# Patient Record
Sex: FEMALE | Race: WHITE
[De-identification: names, ages, dates, MRNs, and addresses within clinical notes are randomized per-mention and may not be internally consistent; named-entity substitution may affect disease eponyms.]

---

## 2017-03-13 ENCOUNTER — HOSPITAL ENCOUNTER (OUTPATIENT)
Dept: HOSPITAL 45 - C.LABMFLN | Age: 72
Discharge: HOME | End: 2017-03-13
Attending: FAMILY MEDICINE
Payer: COMMERCIAL

## 2017-03-13 DIAGNOSIS — I48.0: ICD-10-CM

## 2017-03-13 DIAGNOSIS — I10: ICD-10-CM

## 2017-03-13 DIAGNOSIS — E78.5: Primary | ICD-10-CM

## 2017-03-13 LAB
ALT SERPL-CCNC: 24 U/L (ref 12–78)
ANION GAP SERPL CALC-SCNC: 6 MMOL/L (ref 3–11)
AST SERPL-CCNC: 19 U/L (ref 15–37)
BUN SERPL-MCNC: 18 MG/DL (ref 7–18)
BUN/CREAT SERPL: 28.3 (ref 10–20)
CALCIUM SERPL-MCNC: 8.5 MG/DL (ref 8.5–10.1)
CHLORIDE SERPL-SCNC: 104 MMOL/L (ref 98–107)
CHOLEST/HDLC SERPL: 5.2 {RATIO}
CO2 SERPL-SCNC: 31 MMOL/L (ref 21–32)
CREAT SERPL-MCNC: 0.65 MG/DL (ref 0.6–1.2)
GLUCOSE SERPL-MCNC: 87 MG/DL (ref 70–99)
GLUCOSE UR QL: 27 MG/DL
KETONES UR QL STRIP: 84 MG/DL
MAGNESIUM SERPL-MCNC: 2 MG/DL (ref 1.8–2.4)
NITRITE UR QL STRIP: 143 MG/DL (ref 0–150)
PH UR: 140 MG/DL (ref 0–200)
POTASSIUM SERPL-SCNC: 3.7 MMOL/L (ref 3.5–5.1)
SODIUM SERPL-SCNC: 141 MMOL/L (ref 136–145)
VERY LOW DENSITY LIPOPROT CALC: 29 MG/DL

## 2017-03-20 ENCOUNTER — HOSPITAL ENCOUNTER (OUTPATIENT)
Dept: HOSPITAL 45 - C.RAD | Age: 72
Discharge: HOME | End: 2017-03-20
Attending: UROLOGY
Payer: COMMERCIAL

## 2017-03-20 DIAGNOSIS — N20.0: ICD-10-CM

## 2017-03-20 DIAGNOSIS — R31.29: Primary | ICD-10-CM

## 2017-03-20 NOTE — DIAGNOSTIC IMAGING REPORT
[facility]



HISTORY:      Microscopic hematuria.



COMPARISON: [facility] 9/19/2016.



FINDINGS: The bowel gas pattern is unremarkable. There are no dilated loops of

small bowel to suggest an obstruction.  There are few punctate calcifications

within the right kidney, unchanged. Interval fragmentation of the dominant stone

within the lower pole of the left kidney with at least 4 stones within the lower

pole measuring up to 3 mm. There is also a 4 mm stone within the interpolar

region of the left kidney. Stable 1.7 cm round calcification within the left

deep pelvis which likely represents an epiploic appendage when compared to the

prior CT examination. No ureteral calculi. No pneumoperitoneum or pneumatosis.



IMPRESSION:  

Bilateral nephrolithiasis as described above. No ureteral calculi.







Electronically signed by:  Lupillo Lawson M.D.

3/20/2017 9:59 AM



Dictated Date/Time:  3/20/2017 9:57 AM

## 2017-04-12 ENCOUNTER — HOSPITAL ENCOUNTER (OUTPATIENT)
Dept: HOSPITAL 45 - C.LABMFLN | Age: 72
Discharge: HOME | End: 2017-04-12
Attending: INTERNAL MEDICINE
Payer: COMMERCIAL

## 2017-04-12 DIAGNOSIS — N20.0: Primary | ICD-10-CM

## 2017-04-12 DIAGNOSIS — I10: ICD-10-CM

## 2017-04-12 DIAGNOSIS — R31.29: ICD-10-CM

## 2017-04-12 LAB
ALBUMIN/GLOB SERPL: 1.1 {RATIO} (ref 0.9–2)
ALP SERPL-CCNC: 58 U/L (ref 45–117)
ALT SERPL-CCNC: 26 U/L (ref 12–78)
ANION GAP SERPL CALC-SCNC: 6 MMOL/L (ref 3–11)
APPEARANCE UR: CLEAR
AST SERPL-CCNC: 19 U/L (ref 15–37)
BILIRUB UR-MCNC: (no result) MG/DL
BUN SERPL-MCNC: 28 MG/DL (ref 7–18)
BUN/CREAT SERPL: 37.7 (ref 10–20)
CALCIUM SERPL-MCNC: 8.7 MG/DL (ref 8.5–10.1)
CHLORIDE SERPL-SCNC: 104 MMOL/L (ref 98–107)
CO2 SERPL-SCNC: 30 MMOL/L (ref 21–32)
COLOR UR: YELLOW
CREAT SERPL-MCNC: 0.73 MG/DL (ref 0.6–1.2)
CREAT UR-MCNC: 66 MG/DL
EOSINOPHIL NFR BLD AUTO: 270 K/UL (ref 130–400)
GLOBULIN SER-MCNC: 3.4 GM/DL (ref 2.5–4)
GLUCOSE SERPL-MCNC: 96 MG/DL (ref 70–99)
HCT VFR BLD CALC: 36.4 % (ref 37–47)
MAGNESIUM SERPL-MCNC: 1.9 MG/DL (ref 1.8–2.4)
MANUAL MICROSCOPIC REQUIRED?: NO
MCH RBC QN AUTO: 32 PG (ref 25–34)
MCHC RBC AUTO-ENTMCNC: 34.1 G/DL (ref 32–36)
MCV RBC AUTO: 93.8 FL (ref 80–100)
NITRITE UR QL STRIP: (no result)
PH UR STRIP: 7.5 [PH] (ref 4.5–7.5)
PMV BLD AUTO: 9.3 FL (ref 7.4–10.4)
POTASSIUM SERPL-SCNC: 4.1 MMOL/L (ref 3.5–5.1)
PROT UR STRIP-MCNC: 10.7 MG/DL (ref 0–11.9)
RBC # BLD AUTO: 3.88 M/UL (ref 4.2–5.4)
REVIEW REQ?: NO
SODIUM SERPL-SCNC: 140 MMOL/L (ref 136–145)
SP GR UR STRIP: 1.01 (ref 1–1.03)
URATE SERPL-MCNC: 5.3 MG/DL (ref 2.6–7.2)
URINE PROTIEN/CREAT RATIO: 0.2 (ref 0–0.2)
UROBILINOGEN UR-MCNC: (no result) MG/DL
WBC # BLD AUTO: 5.28 K/UL (ref 4.8–10.8)

## 2017-04-17 NOTE — CODING QUERY MEDICAL NECESSITY
CQSUPPORTING DIAGNOSIS NEEDED





A supporting diagnosis is required for the test/procedure performed on this patient in 
order for us to be reimbursed by the patient's insurance. Please provide a supporting 
diagnosis for the following test/procedure listed below next to the test name along with 
your signature. 



*If there is no additional diagnosis for this patient that would support the following 
test/procedure please document that below next to the test/procedure.



Test(s)/Procedure(s) that require a supporting diagnosis:



DOS   04/12/17         VITAMIN D TEST







Provider Signature:  ______________________________  Date:  _______



Thank you  

Muna Luther

Health Information Management

Phone:  [phone]

Fax:  [phone]



Once completed, please kindly fax back to [phone]



For questions please call [phone]

## 2017-07-10 ENCOUNTER — HOSPITAL ENCOUNTER (OUTPATIENT)
Dept: HOSPITAL 45 - C.RAD | Age: 72
Discharge: HOME | End: 2017-07-10
Attending: UROLOGY
Payer: COMMERCIAL

## 2017-07-10 DIAGNOSIS — N20.1: ICD-10-CM

## 2017-07-10 DIAGNOSIS — R31.29: ICD-10-CM

## 2017-07-10 DIAGNOSIS — N20.0: Primary | ICD-10-CM

## 2017-07-28 ENCOUNTER — HOSPITAL ENCOUNTER (EMERGENCY)
Dept: HOSPITAL 45 - C.EDB | Age: 72
Discharge: HOME | End: 2017-07-28
Payer: COMMERCIAL

## 2017-07-28 VITALS
HEIGHT: 62.01 IN | WEIGHT: 118.61 LBS | HEIGHT: 62.01 IN | BODY MASS INDEX: 21.55 KG/M2 | WEIGHT: 118.61 LBS | BODY MASS INDEX: 21.55 KG/M2

## 2017-07-28 VITALS — OXYGEN SATURATION: 96 % | SYSTOLIC BLOOD PRESSURE: 142 MMHG | DIASTOLIC BLOOD PRESSURE: 62 MMHG | HEART RATE: 67 BPM

## 2017-07-28 VITALS — TEMPERATURE: 97.7 F

## 2017-07-28 DIAGNOSIS — M81.0: ICD-10-CM

## 2017-07-28 DIAGNOSIS — F17.200: ICD-10-CM

## 2017-07-28 DIAGNOSIS — K57.30: ICD-10-CM

## 2017-07-28 DIAGNOSIS — N20.0: ICD-10-CM

## 2017-07-28 DIAGNOSIS — Z87.442: ICD-10-CM

## 2017-07-28 DIAGNOSIS — Z79.82: ICD-10-CM

## 2017-07-28 DIAGNOSIS — Z95.5: ICD-10-CM

## 2017-07-28 DIAGNOSIS — K43.9: ICD-10-CM

## 2017-07-28 DIAGNOSIS — I48.91: ICD-10-CM

## 2017-07-28 DIAGNOSIS — R10.11: Primary | ICD-10-CM

## 2017-07-28 LAB
ALP SERPL-CCNC: 58 U/L (ref 45–117)
ALT SERPL-CCNC: 19 U/L (ref 12–78)
ANION GAP SERPL CALC-SCNC: 7 MMOL/L (ref 3–11)
APPEARANCE UR: CLEAR
AST SERPL-CCNC: 16 U/L (ref 15–37)
BASOPHILS # BLD: 0.05 K/UL (ref 0–0.2)
BASOPHILS NFR BLD: 0.9 %
BILIRUB UR-MCNC: (no result) MG/DL
BUN SERPL-MCNC: 18 MG/DL (ref 7–18)
BUN/CREAT SERPL: 21.8 (ref 10–20)
CALCIUM SERPL-MCNC: 8.8 MG/DL (ref 8.5–10.1)
CHLORIDE SERPL-SCNC: 109 MMOL/L (ref 98–107)
CO2 SERPL-SCNC: 25 MMOL/L (ref 21–32)
COLOR UR: YELLOW
COMPLETE: YES
CREAT CL PREDICTED SERPL C-G-VRATE: 47.9 ML/MIN
CREAT SERPL-MCNC: 0.84 MG/DL (ref 0.6–1.2)
EOSINOPHIL NFR BLD AUTO: 236 K/UL (ref 130–400)
GLUCOSE SERPL-MCNC: 102 MG/DL (ref 70–99)
HCT VFR BLD CALC: 41.1 % (ref 37–47)
IG%: 0.2 %
IMM GRANULOCYTES NFR BLD AUTO: 34.3 %
LYMPHOCYTES # BLD: 1.86 K/UL (ref 1.2–3.4)
MANUAL MICROSCOPIC REQUIRED?: NO
MCH RBC QN AUTO: 31.5 PG (ref 25–34)
MCHC RBC AUTO-ENTMCNC: 33.6 G/DL (ref 32–36)
MCV RBC AUTO: 93.8 FL (ref 80–100)
MONOCYTES NFR BLD: 11.1 %
NEUTROPHILS # BLD AUTO: 2.8 %
NEUTROPHILS NFR BLD AUTO: 50.7 %
NITRITE UR QL STRIP: (no result)
PH UR STRIP: 7 [PH] (ref 4.5–7.5)
PMV BLD AUTO: 9.5 FL (ref 7.4–10.4)
POTASSIUM SERPL-SCNC: 3.3 MMOL/L (ref 3.5–5.1)
RBC # BLD AUTO: 4.38 M/UL (ref 4.2–5.4)
REVIEW REQ?: NO
SODIUM SERPL-SCNC: 141 MMOL/L (ref 136–145)
SP GR UR STRIP: 1.01 (ref 1–1.03)
URINE BILL WITH OR WITHOUT MIC: (no result)
UROBILINOGEN UR-MCNC: (no result) MG/DL
WBC # BLD AUTO: 5.42 K/UL (ref 4.8–10.8)
ZZUR CULT IF INDIC CLEAN CATCH: NO

## 2017-07-28 NOTE — EMERGENCY ROOM VISIT NOTE
History


Report prepared by Cierraibbecki:  Gokul Brush


Under the Supervision of:  Dr. Shad Campbell D.O.


First contact with patient:  02:02


Chief Complaint:  ABDOMINAL PAIN


Stated Complaint:  PAIN IN RIGHT SIDE





History of Present Illness


The patient is a 71 year old female who presents to the Emergency Room with 

complaints of intermittent RUQ abdominal pain beginning this morning. She 

initially thought she was constipated today and gave herself a fleet enema, but 

this has not resolved her pain. She states that her pain wraps around to her 

back. The patient has a history of an appendectomy. She denies any urinary 

symptoms, fevers, vomiting, or diarrhea. She denies any recent injury or 

straining. The patient has a history of frequent kidney stones. She does not 

feel that her symptoms are consistent with a kidney stone. She notes that she 

was scanned





   Source of History:  patient


   Onset:  this morning


   Position:  abdomen (RUQ)


   Timing:  intermittent


   Associated Symptoms:  + back pain (right side), No fevers, No vomiting, No 

diarrhea, No urinary symptoms





Review of Systems


See HPI for pertinent positives and negatives.  A total of ten systems were 

reviewed and were otherwise negative.





Past Medical & Surgical


Medical Problems:


(1) Atrial fibrillation


(2) Cystoscopy


(3) History of appendectomy


(4) Kidney stone


(5) Lithotripsy


(6) Osteoporosis


(7) Placement of stent in coronary artery


(8) Tobacco user








Family History


No pertinent family history stated.





Social History


Smoking Status:  Current Every Day Smoker


Alcohol Use:  none


Drug Use:  none


Marital Status:  


Housing Status:  lives with significant other


Occupation Status:  retired





Current/Historical Medications


Scheduled


Amiloride/Hctz (Moduretic 5MG/50MG *), 0.5 TAB PO @BREAKFAST


Aspirin (Aspirin Ec), 81 MG PO HS


Calcium/Vitamin D (Os-Ministerio 500 Plus D), 1 TAB PO QAM


Cholecalciferol (Vitamin D), 1,000 INTER.UNIT PO QAM


Ciprofloxacin Hcl (Cipro), 500 MG PO BID


Clopidogrel (Plavix), 75 MG PO QAM


Famotidine (Pepcid), 20 MG PO QAM


Isosorbide Mononitrate Ext Rel (Imdur Ext Rel), 30 MG PO QAM


Metoprolol Tartrate (Lopressor) (Lopressor), 25 MG PO QAM


Niacin Ext Rel (Niaspan Ext Rel), 500 MG PO HS


Nitroglycerin (Nitrostat), 0.4 MG UT PRN


Potassium Citrate (Urocit-K), 0.25 MG PO QAM


Simvastatin (Zocor), 40 MG PO HS


Tocopheryl Acet,Dl-Alpha (Vitamin E), 400 INTER.UNIT PO QAM





Scheduled PRN


Alprazolam (Xanax *), 0.25 MG PO Q6 PRN for Anxiety





Allergies


Coded Allergies:  


     No Known Allergies (Unverified , 7/28/17)





Physical Exam


Vital Signs











  Date Time  Temp Pulse Resp B/P (MAP) Pulse Ox O2 Delivery O2 Flow Rate FiO2


 


7/28/17 03:44  77 16 123/71 98 Room Air  


 


7/28/17 02:44  67      


 


7/28/17 01:59 36.5 77 18 150/74 96 Room Air  











Physical Exam


GENERAL: Awake, alert, well-appearing, in no distress


HENT: Normocephalic, atraumatic. Oropharynx unremarkable.


EYES: Normal conjunctiva. Sclera non-icteric.


NECK: Supple. No nuchal rigidity. FROM. No JVD.


RESPIRATORY: Clear to auscultation.


CARDIAC: Regular rate, normal rhythm. Extremities warm and well perfused. 

Pulses equal.


ABDOMEN: Soft, non-distended. No tenderness to palpation. No rebound or 

guarding. No masses.


RECTAL: Deferred.


MUSCULOSKELETAL: Chest examination reveals no tenderness. The back is 

symmetrical on inspection without obvious abnormality. Right flank tenderness. 

No joint edema. 


LOWER EXTREMITIES: Calves are equal size bilaterally and non-tender. No edema. 

No discoloration. 


NEURO: Normal sensorium. No sensory or motor deficits noted. 


SKIN: No rash or jaundice noted.





Medical Decision & Procedures


ER Provider


Diagnostic Interpretation:


CT results per statrad and my review.





CT ABDOMEN & PELVIS:


Right lower quadrant abdominal wall hernia containing loops of small bowl. No 

evidence for bowel obstruction or strangulation. Colonic diverticulosis without 

evidence of diverticulitis. Appendix is not seen. No right lower quadrant 

inflammatory changes to suggest appendicitis. Multiple non-obstructing 

bilateral renal calculi. No obstructing calculus or hydronephrosis. Several 

hyperdensities in the kidneys, some compatible with cysts and some too small to 

characterize. Probable hemangioma in the inferior right hepatic lobe. Probable 

small cyst in the left hepatic lobe. Small gallstones.





Laboratory Results


7/28/17 02:30








Red Blood Count 4.38, Mean Corpuscular Volume 93.8, Mean Corpuscular Hemoglobin 

31.5, Mean Corpuscular Hemoglobin Concent 33.6, Mean Platelet Volume 9.5, 

Neutrophils (%) (Auto) 50.7, Lymphocytes (%) (Auto) 34.3, Monocytes (%) (Auto) 

11.1, Eosinophils (%) (Auto) 2.8, Basophils (%) (Auto) 0.9, Neutrophils # (Auto

) 2.75, Lymphocytes # (Auto) 1.86, Monocytes # (Auto) 0.60, Eosinophils # (Auto

) 0.15, Basophils # (Auto) 0.05





7/28/17 02:30

















Test


  7/28/17


02:00 7/28/17


02:30


 


Urine Color YELLOW  


 


Urine Appearance CLEAR (CLEAR)  


 


Urine pH 7.0 (4.5-7.5)  


 


Urine Specific Gravity


  1.010


(1.000-1.030) 


 


 


Urine Protein NEG (NEG)  


 


Urine Glucose (UA) NEG (NEG)  


 


Urine Ketones NEG (NEG)  


 


Urine Occult Blood NEG (NEG)  


 


Urine Nitrite NEG (NEG)  


 


Urine Bilirubin NEG (NEG)  


 


Urine Urobilinogen NEG (NEG)  


 


Urine Leukocyte Esterase NEG (NEG)  


 


White Blood Count


  


  5.42 K/uL


(4.8-10.8)


 


Red Blood Count


  


  4.38 M/uL


(4.2-5.4)


 


Hemoglobin


  


  13.8 g/dL


(12.0-16.0)


 


Hematocrit  41.1 % (37-47) 


 


Mean Corpuscular Volume


  


  93.8 fL


()


 


Mean Corpuscular Hemoglobin


  


  31.5 pg


(25-34)


 


Mean Corpuscular Hemoglobin


Concent 


  33.6 g/dl


(32-36)


 


Platelet Count


  


  236 K/uL


(130-400)


 


Mean Platelet Volume


  


  9.5 fL


(7.4-10.4)


 


Neutrophils (%) (Auto)  50.7 % 


 


Lymphocytes (%) (Auto)  34.3 % 


 


Monocytes (%) (Auto)  11.1 % 


 


Eosinophils (%) (Auto)  2.8 % 


 


Basophils (%) (Auto)  0.9 % 


 


Neutrophils # (Auto)


  


  2.75 K/uL


(1.4-6.5)


 


Lymphocytes # (Auto)


  


  1.86 K/uL


(1.2-3.4)


 


Monocytes # (Auto)


  


  0.60 K/uL


(0.11-0.59)


 


Eosinophils # (Auto)


  


  0.15 K/uL


(0-0.5)


 


Basophils # (Auto)


  


  0.05 K/uL


(0-0.2)


 


RDW Standard Deviation


  


  46.5 fL


(36.4-46.3)


 


RDW Coefficient of Variation


  


  13.4 %


(11.5-14.5)


 


Immature Granulocyte % (Auto)  0.2 % 


 


Immature Granulocyte # (Auto)


  


  0.01 K/uL


(0.00-0.02)


 


Anion Gap


  


  7.0 mmol/L


(3-11)


 


Est Creatinine Clear Calc


Drug Dose 


  47.9 ml/min 


 


 


Estimated GFR (


American) 


  80.5 


 


 


Estimated GFR (Non-


American 


  69.4 


 


 


BUN/Creatinine Ratio  21.8 (10-20) 


 


Calcium Level


  


  8.8 mg/dl


(8.5-10.1)


 


Total Bilirubin


  


  0.3 mg/dl


(0.2-1)


 


Direct Bilirubin


  


  < 0.1 mg/dl


(0-0.2)


 


Aspartate Amino Transf


(AST/SGOT) 


  16 U/L (15-37) 


 


 


Alanine Aminotransferase


(ALT/SGPT) 


  19 U/L (12-78) 


 


 


Alkaline Phosphatase


  


  58 U/L


()


 


Total Protein


  


  7.5 gm/dl


(6.4-8.2)


 


Albumin


  


  3.7 gm/dl


(3.4-5.0)


 


Lipase


  


  227 U/L


()





Laboratory results reviewed by me





Medications Administered











 Medications


  (Trade)  Dose


 Ordered  Sig/Dominique


 Route  Start Time


 Stop Time Status Last Admin


Dose Admin


 


 Ketorolac


 Tromethamine


  (Toradol Inj)  30 mg  NOW  STAT


 IV  7/28/17 03:22


 7/28/17 03:23 DC 7/28/17 03:37


30 MG











ED Course


0204: The patient was evaluated in room B6. A complete history and physical 

exam was performed.





0322: Ordered Toradol Inj 30 mg IV. 





0430: I reevaluated the patient. Discussed results and discharge instructions: 

she verbalized understanding and agreement. The patient is ready for discharge.





Medical Decision


Differential diagnoses include but are not limited to; kidney stone, UTI, 

musculoskeletal, and gallbladder attack. 





Resting in no distress, i suspect more muscular pain. Abd soft, nontoxic pt, 

afebrile; discussed w/u with patient at bedside at 443am





Impression





 Primary Impression:  


 Right flank pain





Scribe Attestation


The scribe's documentation has been prepared under my direction and personally 

reviewed by me in its entirety. I confirm that the note above accurately 

reflects all work, treatment, procedures, and medical decision making performed 

by me.





Departure Information


Dispostion


Home / Self-Care





Prescriptions





Hydrocodone/Acetaminophen 5MG/325MG (Norco 5MG/325MG)  Tab


1 TABLET PO Q6 Y for Pain, #14 TAB


   Prov: Shad Campbell, DO         7/28/17





Referrals


Chuck Guillory M.D. (PCP)





Patient Instructions


ED Flank Pain Uncertain Cause, My [facility]

## 2017-07-28 NOTE — DIAGNOSTIC IMAGING REPORT
ABD/PELVIS IV CONTRAST ONLY



CLINICAL HISTORY: 71 years-old Female presenting with right flank pain, history

of kidney stones. 



TECHNIQUE: Multidetector CT of the abdomen and pelvis was performed after the

administration of intravenous contrast. IV contrast: 93 mL of Optiray 320. A

dose lowering technique was used consistent with the principles of ALARA (as low

as reasonably achievable). 



COMPARISON: 8/29/2015.



CT DOSE (mGy.cm): The estimated cumulative dose is 338.17 mGycm.



FINDINGS:



 topogram: Unremarkable.



Lung bases: Lung bases clear. Coronary artery and mitral annular calcification.

Normal heart size. No pericardial or pleural effusion.



Liver: Normal morphology. Well-defined 7 mm hypodensity in the medial left

hepatic lobe likely hepatic cyst or hamartoma although incompletely

characterized on the single phase examination. Patent hepatic vasculature.



Biliary: No intrahepatic or extrahepatic biliary ductal dilatation. Few layering

gallstones may be present. Suggestion of adenomyomatosis of the gallbladder

fundus. Gallbladder nondistended. 



Pancreas: Normal.



Spleen: Normal.



Adrenal glands: Normal.



Kidneys and ureters: Nonobstructing 2 mm calculus at the right upper pole.

Nonobstructing 2 mm calculus in the right interpolar region. Nonobstructing 2 mm

calculus at the right lower pole. Numerous nonobstructing calculi in the left

kidney the largest at the lower pole measuring 7 mm. No convincing evidence of

ureteral calculus, although multiple pelvic phleboliths are noted similar

distribution as on prior exam. Mild bilateral pelvocaliectasis. Multiple

well-defined hypodensities in the kidneys, likely simple cysts. Mild urothelial

thickening bilaterally.



Gastrointestinal tract: Sigmoid and descending colonic diverticulosis. No bowel

obstruction. Large duodenal diverticulum near the liver hilum and along the

posterior aspect of the descending duodenum. No associated inflammatory change.



Peritoneal cavity: No free fluid or intraperitoneal gas.



Bladder: Normal.



Pelvic organs: Uterus and ovaries normal.



Vasculature: Atherosclerosis of the abdominal aorta. Minimal ectasia and

irregularity of the infrarenal portion.



Lymph nodes: No enlarged lymph nodes in the abdomen or pelvis.



Abdominal wall: Anterior peritoneal defect with herniation of mesenteric fat and

small bowel without evidence of obstruction. The defect is lateral to the right

rectus abdominis and inferior to the umbilicus.



Musculoskeletal: Degenerative changes of the spine.



IMPRESSION:

1.  Multiple nonobstructing renal calculi bilaterally. Mild bilateral

pelvocaliectasis without convincing evidence of obstruction on the current exam.

No definitive ureteral calculus, allowing for the presence of numerous

phleboliths in the pelvis.



2.  Suggestion of mild urothelial thickening in the bilateral collecting

systems. This could be seen in the setting of infection. Correlate with

urinalysis.



3.  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



4.  Additional findings as above.











Electronically signed by:  Stephen Victoria M.D.

7/28/2017 7:39 AM



Dictated Date/Time:  7/28/2017 7:27 AM

## 2017-08-05 ENCOUNTER — HOSPITAL ENCOUNTER (INPATIENT)
Dept: HOSPITAL 45 - C.ED | Age: 72
LOS: 1 days | Discharge: HOME | DRG: 694 | End: 2017-08-06
Attending: FAMILY MEDICINE | Admitting: HOSPITALIST
Payer: COMMERCIAL

## 2017-08-05 VITALS
DIASTOLIC BLOOD PRESSURE: 70 MMHG | HEART RATE: 84 BPM | TEMPERATURE: 98.6 F | SYSTOLIC BLOOD PRESSURE: 166 MMHG | OXYGEN SATURATION: 96 %

## 2017-08-05 VITALS — OXYGEN SATURATION: 98 % | HEART RATE: 82 BPM

## 2017-08-05 VITALS
WEIGHT: 115.52 LBS | WEIGHT: 115.52 LBS | BODY MASS INDEX: 21.26 KG/M2 | HEIGHT: 62 IN | HEIGHT: 62 IN | BODY MASS INDEX: 21.26 KG/M2

## 2017-08-05 VITALS
HEART RATE: 93 BPM | TEMPERATURE: 98.78 F | DIASTOLIC BLOOD PRESSURE: 69 MMHG | OXYGEN SATURATION: 91 % | SYSTOLIC BLOOD PRESSURE: 150 MMHG

## 2017-08-05 VITALS — OXYGEN SATURATION: 94 % | HEART RATE: 86 BPM

## 2017-08-05 VITALS
SYSTOLIC BLOOD PRESSURE: 148 MMHG | HEART RATE: 97 BPM | TEMPERATURE: 98.42 F | OXYGEN SATURATION: 91 % | DIASTOLIC BLOOD PRESSURE: 69 MMHG

## 2017-08-05 VITALS — OXYGEN SATURATION: 98 %

## 2017-08-05 VITALS
TEMPERATURE: 98.06 F | OXYGEN SATURATION: 96 % | DIASTOLIC BLOOD PRESSURE: 59 MMHG | SYSTOLIC BLOOD PRESSURE: 158 MMHG | HEART RATE: 76 BPM

## 2017-08-05 DIAGNOSIS — Z95.5: ICD-10-CM

## 2017-08-05 DIAGNOSIS — M81.0: ICD-10-CM

## 2017-08-05 DIAGNOSIS — E87.1: ICD-10-CM

## 2017-08-05 DIAGNOSIS — K21.9: ICD-10-CM

## 2017-08-05 DIAGNOSIS — N13.30: Primary | ICD-10-CM

## 2017-08-05 DIAGNOSIS — M54.6: ICD-10-CM

## 2017-08-05 DIAGNOSIS — Z79.891: ICD-10-CM

## 2017-08-05 DIAGNOSIS — I25.10: ICD-10-CM

## 2017-08-05 DIAGNOSIS — F41.9: ICD-10-CM

## 2017-08-05 DIAGNOSIS — Z79.02: ICD-10-CM

## 2017-08-05 DIAGNOSIS — Z79.899: ICD-10-CM

## 2017-08-05 DIAGNOSIS — Z96.0: ICD-10-CM

## 2017-08-05 DIAGNOSIS — R10.9: ICD-10-CM

## 2017-08-05 DIAGNOSIS — Z87.442: ICD-10-CM

## 2017-08-05 DIAGNOSIS — I48.0: ICD-10-CM

## 2017-08-05 DIAGNOSIS — I10: ICD-10-CM

## 2017-08-05 DIAGNOSIS — R11.0: ICD-10-CM

## 2017-08-05 DIAGNOSIS — I25.2: ICD-10-CM

## 2017-08-05 DIAGNOSIS — E78.00: ICD-10-CM

## 2017-08-05 DIAGNOSIS — N39.0: ICD-10-CM

## 2017-08-05 DIAGNOSIS — Z79.82: ICD-10-CM

## 2017-08-05 LAB
ALP SERPL-CCNC: 56 U/L (ref 45–117)
ALT SERPL-CCNC: 18 U/L (ref 12–78)
ANION GAP SERPL CALC-SCNC: 6 MMOL/L (ref 3–11)
ANION GAP SERPL CALC-SCNC: 6 MMOL/L (ref 3–11)
APPEARANCE UR: CLEAR
AST SERPL-CCNC: 17 U/L (ref 15–37)
BASOPHILS # BLD: 0.02 K/UL (ref 0–0.2)
BASOPHILS NFR BLD: 0.3 %
BILIRUB UR-MCNC: (no result) MG/DL
BUN SERPL-MCNC: 11 MG/DL (ref 7–18)
BUN SERPL-MCNC: 6 MG/DL (ref 7–18)
BUN/CREAT SERPL: 11.8 (ref 10–20)
BUN/CREAT SERPL: 15.7 (ref 10–20)
CALCIUM SERPL-MCNC: 7.7 MG/DL (ref 8.5–10.1)
CALCIUM SERPL-MCNC: 8.3 MG/DL (ref 8.5–10.1)
CHLORIDE SERPL-SCNC: 101 MMOL/L (ref 98–107)
CHLORIDE SERPL-SCNC: 95 MMOL/L (ref 98–107)
CO2 SERPL-SCNC: 27 MMOL/L (ref 21–32)
CO2 SERPL-SCNC: 29 MMOL/L (ref 21–32)
COLOR UR: YELLOW
COMPLETE: YES
CREAT CL PREDICTED SERPL C-G-VRATE: 58.3 ML/MIN
CREAT CL PREDICTED SERPL C-G-VRATE: 77.4 ML/MIN
CREAT SERPL-MCNC: 0.52 MG/DL (ref 0.6–1.2)
CREAT SERPL-MCNC: 0.69 MG/DL (ref 0.6–1.2)
EOSINOPHIL NFR BLD AUTO: 209 K/UL (ref 130–400)
GLUCOSE SERPL-MCNC: 132 MG/DL (ref 70–99)
GLUCOSE SERPL-MCNC: 88 MG/DL (ref 70–99)
HCT VFR BLD CALC: 40.1 % (ref 37–47)
IG%: 0.2 %
IMM GRANULOCYTES NFR BLD AUTO: 19.1 %
LYMPHOCYTES # BLD: 1.25 K/UL (ref 1.2–3.4)
MAGNESIUM SERPL-MCNC: 2 MG/DL (ref 1.8–2.4)
MANUAL MICROSCOPIC REQUIRED?: NO
MCH RBC QN AUTO: 32.3 PG (ref 25–34)
MCHC RBC AUTO-ENTMCNC: 34.7 G/DL (ref 32–36)
MCV RBC AUTO: 93 FL (ref 80–100)
MONOCYTES NFR BLD: 10.7 %
NEUTROPHILS # BLD AUTO: 0.6 %
NEUTROPHILS NFR BLD AUTO: 69.1 %
NITRITE UR QL STRIP: (no result)
PH UR STRIP: 7 [PH] (ref 4.5–7.5)
PMV BLD AUTO: 9.8 FL (ref 7.4–10.4)
POTASSIUM SERPL-SCNC: 3.4 MMOL/L (ref 3.5–5.1)
POTASSIUM SERPL-SCNC: 3.7 MMOL/L (ref 3.5–5.1)
RBC # BLD AUTO: 4.31 M/UL (ref 4.2–5.4)
REVIEW REQ?: NO
SODIUM SERPL-SCNC: 130 MMOL/L (ref 136–145)
SODIUM SERPL-SCNC: 134 MMOL/L (ref 136–145)
SP GR UR STRIP: 1.02 (ref 1–1.03)
URINE BILL WITH OR WITHOUT MIC: (no result)
URINE EPITHELIAL CELL AUTO: (no result) /LPF (ref 0–5)
UROBILINOGEN UR-MCNC: (no result) MG/DL
WBC # BLD AUTO: 6.55 K/UL (ref 4.8–10.8)
ZZUR CULT IF INDIC CLEAN CATCH: NO

## 2017-08-05 RX ADMIN — NITROGLYCERIN SCH INCH: 20 OINTMENT TOPICAL at 12:45

## 2017-08-05 RX ADMIN — IPRATROPIUM BROMIDE SCH MG: 0.5 SOLUTION RESPIRATORY (INHALATION) at 13:52

## 2017-08-05 RX ADMIN — CEFTRIAXONE SODIUM SCH MLS/HR: 1 INJECTION, POWDER, FOR SOLUTION INTRAVENOUS at 12:45

## 2017-08-05 RX ADMIN — LEVALBUTEROL SCH MG: 1.25 SOLUTION, CONCENTRATE RESPIRATORY (INHALATION) at 13:52

## 2017-08-05 RX ADMIN — LEVALBUTEROL SCH MG: 1.25 SOLUTION, CONCENTRATE RESPIRATORY (INHALATION) at 19:18

## 2017-08-05 RX ADMIN — SODIUM CHLORIDE AND POTASSIUM CHLORIDE SCH MLS/HR: 9; 1.49 INJECTION, SOLUTION INTRAVENOUS at 21:09

## 2017-08-05 RX ADMIN — IPRATROPIUM BROMIDE SCH MG: 0.5 SOLUTION RESPIRATORY (INHALATION) at 19:15

## 2017-08-05 RX ADMIN — FAMOTIDINE SCH MLS/HR: 10 INJECTION INTRAVENOUS at 17:47

## 2017-08-05 RX ADMIN — NITROGLYCERIN SCH INCH: 20 OINTMENT TOPICAL at 17:48

## 2017-08-05 RX ADMIN — SODIUM CHLORIDE AND POTASSIUM CHLORIDE SCH MLS/HR: 9; 1.49 INJECTION, SOLUTION INTRAVENOUS at 12:44

## 2017-08-05 NOTE — DIAGNOSTIC IMAGING REPORT
CT THORACIC SPINE WITHOUT



CT DOSE:    



CLINICAL HISTORY: Thoracic spine pain    



TECHNIQUE: Helical images were acquired in transverse plane. Sagittal and

coronal reformatted images were acquired  A dose lowering technique was utilized

adhering to the principles of ALARA.





COMPARISON STUDY:  None.



FINDINGS: No paraspinal masses are visualized.



There is pulmonary emphysema.



No fractures are visualized. No destructive lesions are evident.



IMPRESSION:  

1. No fractures or subluxations identified

2. No destructive lesions are visualized

3. No paraspinal masses are visualized.

4. Pulmonary emphysema 







Electronically signed by:  Rehan Galeana M.D.

8/5/2017 9:30 AM



Dictated Date/Time:  8/5/2017 9:28 AM

## 2017-08-05 NOTE — HISTORY AND PHYSICAL
History & Physical


Date & Time of Service:


Aug 5, 2017 at 11:26


Chief Complaint:


Pain In Lower Back, Kidney Stones


Primary Care Physician:


Chuck Guillory M.D.


History of Present Illness


Source:  patient, spouse, hospital records


The patient is a 71-year-old female who presented to the emergency department 

initially on July 28 with symptoms of right flank pain, was assessed there and 

release of that time on oral pain medications.  She then went to the [facility] for 

a week planned vacation, but felt miserable the entire time there, and was seen 

by emergency care who then referred her to a local hospital where she was 

admitted overnight.  During that hospitalization she had a dobutamine stress 

echocardiogram which reportedly was normal and also had a CT of the chest for 

pulmonary embolus which is normal as well.  The patient's symptoms continued to 

worsen and expanded to cause bilateral interscapular pain, and thus the patient'

s and her  returned back to [place] early from their vacation.  

She presents emergency department today with right flank pain and bilateral 

interscapular area pain, feels miserable, has the chills, and has had decreased 

oral intake over the past 48 - 72 hours.  She has had kidney stones in the past

, but the pain in between her shoulder blades is completely new for her, and she

's never had the chills like she has now.





Past Medical/Surgical History


Medical Problems:


(1) Atrial fibrillation


Permanent Comment: paroxysmal


associated with colonoscopy





Status: Resolved  





(2) Cystoscopy


Status: Resolved  





(3) History of appendectomy


Status: Resolved  





(4) Kidney stone


Status: Resolved  





(5) Lithotripsy


Status: Resolved  





(6) Osteoporosis


Status: Chronic  





(7) Placement of stent in coronary artery


Status: Chronic  





(8) Tobacco user


Status: Chronic  











Family History





Cancer


Heart disease





Social History


Smoking Status:  Never Smoker


Smokeless Tobacco Use:  No


Alcohol Use:  none


Drug Use:  none


Marital Status:  


Housing status:  lives with family


Occupational Status:  retired





Immunizations


History of Influenza Vaccine:  N/A


History of Tetanus Vaccine?:  Yes


History of Pneumococcal:  Yes


History of Hepatitis B Vaccine:  No





Multi-Drug Resistant Organisms


History of MDRO:  No





Allergies


Coded Allergies:  


     No Known Allergies (Unverified , 8/5/17)





Home Medications


Scheduled


Amiloride/Hctz (Moduretic 5MG/50MG *), 0.5 TAB PO @BREAKFAST


Aspirin (Aspirin Ec), 81 MG PO HS


Calcium/Vitamin D (Os-Ministerio 500 Plus D), 1 TAB PO QAM


Cholecalciferol (Vitamin D), 1,000 INTER.UNIT PO QAM


Clopidogrel (Plavix), 75 MG PO QAM


Famotidine (Pepcid), 20 MG PO QAM


Isosorbide Mononitrate Ext Rel (Imdur Ext Rel), 30 MG PO QAM


Metoprolol Tartrate (Lopressor) (Lopressor), 25 MG PO QAM


Niacin Ext Rel (Niaspan Ext Rel), 500 MG PO HS


Nitroglycerin (Nitrostat), 0.4 MG UT PRN


Potassium Citrate (Urocit-K), 0.25 MG PO QAM


Simvastatin (Zocor), 40 MG PO HS


Tocopheryl Acet,Dl-Alpha (Vitamin E), 400 INTER.UNIT PO QAM





Scheduled PRN


Alprazolam (Xanax *), 0.25 MG PO Q6 PRN for Anxiety


Hydrocodone/Acetaminophen 5MG/325MG (Norco 5MG/325MG), 1 TABLET PO Q6 PRN for 

Pain





Review of Systems


The patient denies chest pain, palpitations, shortness of breath, cough, sore 

throat, nausea, vomiting, abdominal pain, blood in urine or stool, dysuria, 

urinary frequency or urgency, lightheadedness, dizziness, headache, memory loss

, rash, abnormal bruising or bleeding, imbalance, focal or generalized weakness

, numbness or tingling in arms or legs, arthralgias or myalgias, neck pain, 

night sweats, or allergy symptoms.


The review of systems is otherwise negative other than for that already noted 

above, and at least 10 systems have been reviewed.





Physical Exam


Vital Signs











  Date Time  Temp Pulse Resp B/P (MAP) Pulse Ox O2 Delivery O2 Flow Rate FiO2


 


8/5/17 10:59     95 Nasal Cannula 2.0 


 


8/5/17 10:34  70 20 174/67 95 Room Air  


 


8/5/17 08:56  71 18 131/51 94 Room Air  


 


8/5/17 07:59  67 20 155/62 96 Room Air  


 


8/5/17 07:16  71      


 


8/5/17 06:30 36.4 79 20 161/75 93 Room Air  








The patient is awake, alert and oriented 3, normocephalic and atraumatic, 

appears chronically ill, is wrapped in covers due to chills, otherwise lying in 

bed and in no acute distress.


HEENT--PERRL, EOMI, mucous membranes  and oropharynx dry.


Neck--supple, no JVD or bruits, thyroid normal, trachea midline, no adenopathy.


Heart--normal S1 and S2, no extra beats, no murmurs, rubs or gallops.


Lungs--clear bilaterally but diminished throughout, no respiratory distress, no 

accessory muscle use.


Abdomen--normal bowel sounds and soft, tender over flanks and interscapular 

area.  Nondistended, no hernias or masses,  no organomegaly.


Extremities--no cyanosis, clubbing or edema. There are good distal pulses b/l.


Dermatologic--normal skin turgor, normal color, warm and dry, no abnormal lymph 

nodes, no rash.


Neurologic--cranial nerves II through XII grossly intact, motor and sensory 

examination normal.


Rheumatologic--normal range of motion, nontender, muscles and joints.


Psychiatric--normal affect.





Diagnostics


Laboratory Results





Results Past 24 Hours








Test


  8/5/17


06:45 8/5/17


06:50 8/5/17


11:12 Range/Units


 


 


Urine Color YELLOW    


 


Urine Appearance CLEAR   CLEAR  


 


Urine pH 7.0   4.5-7.5  


 


Urine Specific Gravity 1.017   1.000-1.030  


 


Urine Protein NEG   NEG  


 


Urine Glucose (UA) NEG   NEG  


 


Urine Ketones 2+   NEG  


 


Urine Occult Blood 1+   NEG  


 


Urine Nitrite NEG   NEG  


 


Urine Bilirubin NEG   NEG  


 


Urine Urobilinogen NEG   NEG  


 


Urine Leukocyte Esterase NEG   NEG  


 


Urine WBC (Auto) 1-5   0-5  /hpf


 


Urine RBC (Auto) 5-10   0-4  /hpf


 


Urine Hyaline Casts (Auto) 0   0-5  /lpf


 


Urine Epithelial Cells (Auto) 5-10   0-5  /lpf


 


Urine Bacteria (Auto) NEG   NEG  


 


White Blood Count  6.55  4.8-10.8  K/uL


 


Red Blood Count  4.31  4.2-5.4  M/uL


 


Hemoglobin  13.9  12.0-16.0  g/dL


 


Hematocrit  40.1  37-47  %


 


Mean Corpuscular Volume  93.0    fL


 


Mean Corpuscular Hemoglobin  32.3  25-34  pg


 


Mean Corpuscular Hemoglobin


Concent 


  34.7


  


  32-36  g/dl


 


 


Platelet Count  209  130-400  K/uL


 


Mean Platelet Volume  9.8  7.4-10.4  fL


 


Neutrophils (%) (Auto)  69.1   %


 


Lymphocytes (%) (Auto)  19.1   %


 


Monocytes (%) (Auto)  10.7   %


 


Eosinophils (%) (Auto)  0.6   %


 


Basophils (%) (Auto)  0.3   %


 


Neutrophils # (Auto)  4.53  1.4-6.5  K/uL


 


Lymphocytes # (Auto)  1.25  1.2-3.4  K/uL


 


Monocytes # (Auto)  0.70  0.11-0.59  K/uL


 


Eosinophils # (Auto)  0.04  0-0.5  K/uL


 


Basophils # (Auto)  0.02  0-0.2  K/uL


 


RDW Standard Deviation  44.2  36.4-46.3  fL


 


RDW Coefficient of Variation  12.9  11.5-14.5  %


 


Immature Granulocyte % (Auto)  0.2   %


 


Immature Granulocyte # (Auto)  0.01  0.00-0.02  K/uL


 


Sodium Level  130  136-145  mmol/L


 


Potassium Level  3.4  3.5-5.1  mmol/L


 


Chloride Level  95    mmol/L


 


Carbon Dioxide Level  29  21-32  mmol/L


 


Anion Gap  6.0  3-11  mmol/L


 


Blood Urea Nitrogen  11  7-18  mg/dl


 


Creatinine


  


  0.69


  


  0.60-1.20


mg/dl


 


Est Creatinine Clear Calc


Drug Dose 


  58.3


  


   ml/min


 


 


Estimated GFR (


American) 


  100.8


  


   


 


 


Estimated GFR (Non-


American 


  87.0


  


   


 


 


BUN/Creatinine Ratio  15.7  10-20  


 


Random Glucose  132  70-99  mg/dl


 


Calcium Level  8.3  8.5-10.1  mg/dl


 


Total Bilirubin  0.5  0.2-1  mg/dl


 


Direct Bilirubin  0.1  0-0.2  mg/dl


 


Aspartate Amino Transf


(AST/SGOT) 


  17


  


  15-37  U/L


 


 


Alanine Aminotransferase


(ALT/SGPT) 


  18


  


  12-78  U/L


 


 


Alkaline Phosphatase  56    U/L


 


Troponin I  < 0.015  0-0.045  ng/ml


 


Total Protein  7.7  6.4-8.2  gm/dl


 


Albumin  3.9  3.4-5.0  gm/dl











Diagnostic Radiology


                                                                               

                                                                 


Patient Name: JUDSON POWERS                               Unit Number:  

[id number]                  


 





 











Dictated: 08/05/17 0747


 


Transcribed: 08/05/17 0747


 


ARG


 


Printed Date/Time: [~ rep prt dt]/[~ rep prt tm]








 [~ rep ct labl] - [~ rep ct ivnm]


 





   [facility]


 Radiology Department


 [place] [address] [phone]





 











Dictated: 08/05/17 0747


 


Transcribed: 08/05/17 0747


 


ARG


 


Printed Date/Time: [~ rep prt dt]/[~ rep prt tm]








 [~ rep ct labl] - [~ rep ct ivnm]


 








EXAMINATION: RENAL ULTRASOUND





CLINICAL HISTORY: Right renal colic    





COMPARISON STUDY:  8/4/2014, CT scan dated 7/28/2017





FINDINGS: The right kidney measures 10 cm. The left kidney measures 10 cm. 


There is mild bilateral hydronephrosis.  There are multiple bilateral renal


calculi.





No bladder abnormalities are visualized.  Bilateral ureteral jets were


visualized.


                 


IMPRESSION : 


1. Multiple bilateral renal calculi


2. Mild bilateral hydronephrosis


3. Bilateral ureteral jets were visualized











Electronically signed by:  Rehan Galeana M.D.


8/5/2017 7:50 AM





Dictated Date/Time:  8/5/2017 7:47 AM





 The status of this report is Signed.   


 Draft = Not yet reviewed or approved by Radiologist.  


 Signed = Reviewed and approved by Radiologist.


<AttendingPhy></AttendingPhy> <FamilyPhy>Chuck Guillory M.D.</FamilyPhy> <

PrimaryPhy>Chuck Guillory M.D.</PrimaryPhy> <UnitNumber>[id number]</UnitNumber> <

VisitNumber>[id number]</VisitNumber> <PatientName>JUDSON POWERS</PatientName

> <DateOfBirth>1945</DateOfBirth> <Location>[facility]</Location> <ServiceDate>

08/05/17</ServiceDate> <MNE>ESINDI</MNE> <OrderingPhy>Lucrecia Ma M.D.

</OrderingPhy> <OrderingPhyMNE>f rep ord dr lopes</OrderingPhyMNE> <

DictatingPhyMNE>f rep dict dr lopes</DictatingPhyMNE> <CCListMNE>f rep ct mne</

CCListMNE> <AdmittingPhyMNE>f pt admit dr lopes</AdmittingPhyMNE> <AttendingPhyMNE

>f pt attend dr lopes</AttendingPhyMNE>


<ConsultingPhyMNE>f pt consult dr lopes</ConsultingPhyMNE> <FamilyPhyMNE>f pt fam 

dr lopes</FamilyPhyMNE> <OtherPhyMNE>f pt other dr lopes</OtherPhyMNE> <

PrimaryPhyMNE>f pt prim care dr lopes</PrimaryPhyMNE> <ReferringPhyMNE>f pt 

referring dr lopes</ReferringPhyMNE>


                                                                               

                                                                 


Patient Name: JUDSON POWERS                               Unit Number:  

[id number]                  


 





 











Dictated: 08/05/17 0750


 


Transcribed: 08/05/17 0750


 


ARG


 


Printed Date/Time: [~ rep prt dt]/[~ rep prt tm]








 [~ rep ct labl] - [~ rep ct ivnm]


 





   [facility]


 Radiology Department


 [place] [address] [phone]





 











Dictated: 08/05/17 0750


 


Transcribed: 08/05/17 0750


 


ARG


 


Printed Date/Time: [~ rep prt dt]/[~ rep prt tm]








 [~ rep ct labl] - [~ rep ct ivnm]


 











CLINICAL HISTORY: R renal colic    





COMPARISON STUDY:  7/10/2017 





FINDINGS: There is no pathologic bowel dilatation. There is a mild amount of


stool present within the right colon similar to the prior study. There are no


transition zones indicate bowel obstruction. Bilateral renal calculi are


visualized.





IMPRESSION:  


1. Bilateral nephrolithiasis, similar to the prior study


2. No evidence of pathologic bowel dilatation 











Electronically signed by:  Rehan Galeana M.D.


8/5/2017 7:51 AM





Dictated Date/Time:  8/5/2017 7:50 AM





 The status of this report is Signed.   


 Draft = Not yet reviewed or approved by Radiologist.  


 Signed = Reviewed and approved by Radiologist.


<AttendingPhy></AttendingPhy> <FamilyPhy>Chuck Guillory M.D.</FamilyPhy> <

PrimaryPhy>Chuck Guillory M.D.</PrimaryPhy> <UnitNumber>[id number]</UnitNumber> <

VisitNumber>[id number]</VisitNumber> <PatientName>JUDSON POWERS</PatientName

> <DateOfBirth>1945</DateOfBirth> <Location>[facility]</Location> <ServiceDate>

08/05/17</ServiceDate> <MNE>ESINDI</MNE> <OrderingPhy>Lucrecia Ma M.D.

</OrderingPhy> <OrderingPhyMNE>f rep ord dr lopes</OrderingPhyMNE> <

DictatingPhyMNE>f rep dict dr lopes</DictatingPhyMNE> <CCListMNE>f rep ct mne</

CCListMNE> <AdmittingPhyMNE>f pt admit dr lopes</AdmittingPhyMNE> <AttendingPhyMNE

>f pt attend dr lopes</AttendingPhyMNE>


<ConsultingPhyMNE>f pt consult dr lopes</ConsultingPhyMNE> <FamilyPhyMNE>f pt fam 

dr lopes</FamilyPhyMNE> <OtherPhyMNE>f pt other dr lopes</OtherPhyMNE> <

PrimaryPhyMNE>f pt prim care dr lopes</PrimaryPhyMNE> <ReferringPhyMNE>f pt 

referring dr lopes</ReferringPhyMNE>


                                                                               

                                                                 


Patient Name: JUDSON POWERS                               Unit Number:  

[id number]                  


 





 











Dictated: 08/05/17 0928


 


Transcribed: 08/05/17 0928


 


ARG


 


Printed Date/Time: [~ rep prt dt]/[~ rep prt tm]








 [~ rep ct labl] - [~ rep ct ivnm]


 





   [facility]


 Radiology Department


 [place] [address] [phone]





 











Dictated: 08/05/17 0928


 


Transcribed: 08/05/17 0928


 


ARG


 


Printed Date/Time: [~ rep prt dt]/[~ rep prt tm]








 [~ rep ct labl] - [~ rep ct ivnm]


 








CT THORACIC SPINE WITHOUT





CT DOSE:    





CLINICAL HISTORY: Thoracic spine pain    





TECHNIQUE: Helical images were acquired in transverse plane. Sagittal and


coronal reformatted images were acquired  A dose lowering technique was utilized


adhering to the principles of ALARA.








COMPARISON STUDY:  None.





FINDINGS: No paraspinal masses are visualized.





There is pulmonary emphysema.





No fractures are visualized. No destructive lesions are evident.





IMPRESSION:  


1. No fractures or subluxations identified


2. No destructive lesions are visualized


3. No paraspinal masses are visualized.


4. Pulmonary emphysema 











Electronically signed by:  Rehan Galeana M.D.


8/5/2017 9:30 AM





Dictated Date/Time:  8/5/2017 9:28 AM





 The status of this report is Signed.   


 Draft = Not yet reviewed or approved by Radiologist.  


 Signed = Reviewed and approved by Radiologist.


<AttendingPhy></AttendingPhy> <FamilyPhy>Chuck Guillory M.D.</FamilyPhy> <

PrimaryPhy>Chuck Guillory M.D.</PrimaryPhy> <UnitNumber>[id number]</UnitNumber> <

VisitNumber>[id number]</VisitNumber> <PatientName>JUDSON POWERS</PatientName

> <DateOfBirth>1945</DateOfBirth> <Location>[facility]</Location> <ServiceDate>

08/05/17</ServiceDate> <MNE>ESINDI</MNE> <OrderingPhy>Lucrecia Ma M.D.

</OrderingPhy> <OrderingPhyMNE>f rep ord dr lopes</OrderingPhyMNE> <

DictatingPhyMNE>f rep dict dr lopes</DictatingPhyMNE> <CCListMNE>f rep ct mne</

CCListMNE> <AdmittingPhyMNE>f pt admit dr lopes</AdmittingPhyMNE> <AttendingPhyMNE

>f pt attend dr lopes</AttendingPhyMNE>


<ConsultingPhyMNE>f pt consult dr lopes</ConsultingPhyMNE> <FamilyPhyMNE>f pt fam 

dr lopes</FamilyPhyMNE> <OtherPhyMNE>f pt other dr lopes</OtherPhyMNE> <

PrimaryPhyMNE>f pt prim care dr lopes</PrimaryPhyMNE> <ReferringPhyMNE>f pt 

referring dr lopes</ReferringPhyMNE>


                                                                               

                                                                 


Patient Name: JUDSON POWERS                               Unit Number:  

[id number]                  


 





 











Dictated: 08/05/17 0920


 


Transcribed: 08/05/17 0928


 


ARG


 


Printed Date/Time: [~ rep prt dt]/[~ rep prt tm]








 [~ rep ct labl] - [~ rep ct ivnm]


 





   [facility]


 Radiology Department


 [place] [address]


 [phone]





 











Dictated: 08/05/17 0920


 


Transcribed: 08/05/17 0928


 


ARG


 


Printed Date/Time: [~ rep prt dt]/[~ rep prt tm]








 [~ rep ct labl] - [~ rep ct ivnm]


 








CT ANGIOGRAPHY OF THE CHEST WITHOUT A WITH CONTRAST





CLINICAL HISTORY: Upper back pain. Possible aortic dissection.    





COMPARISON STUDY:  No previous studies for comparison.





FINDINGS: Unenhanced images were obtained through the upper thorax. A dose


lowering technique was used consistent with the principles of ALARA. CT


angiography was then performed in a dynamic helical fashion during intravenous


administration of 119 cc Optiray 320. MIP imaging was performed.





Unenhanced images reveal no evidence of acute aortic trauma.





Postcontrast images reveal no evidence of thoracic aortic aneurysm or


dissection. There are no pulmonary artery filling defects to indicate acute


pulmonary embolism.





There are no pathologically enlarged axillary, mediastinal, or hilar lymph


nodes.





No pleural effusions are visualized.





There is a 9 mm solid subpleural pulmonary nodule within the right upper lobe


anteriorly.





There is pulmonary emphysema.





There is a to small to characterize 8 mm hypodensity within the central liver.


There is a partially visualized 6 mm hypodensity within the left kidney likely


representing a cyst.





IMPRESSION:  


1. No evidence of thoracic aortic aneurysm or dissection


2. No evidence of acute pulmonary embolism


3. 9 mm solid right upper lobe pulmonary nodule.





Please refer to below summary of Fleischner criteria recommendations for


follow-up of incidental CT nodules (OMID Maldonado, Guidelines for management of


small pulmonary nodules detected on CT scans:  A statement from the Fleischner


Society, Radiology 237: [phone].)





SOLID NODULES





Solitary nodule size: <6 mm


*  low risk patients:  no follow-up needed


*  high risk patients:  optional CT at 12 months





Solitary nodule size:  6-8 mm


*  low risk patients:  follow-up at 6-12 months, then consider further follow-up


at 18-24 months


*  high risk patients:  initial follow-up CT at 6-12 months and then at 18-24


months if no change





Solitary nodule size: >8 mm


*  either low or high risk patients - consider follow-up CT at 3 months, and/or


CT-PET, and/or biopsy





Multiple nodules size:  <6 mm


*  low risk patients:  no routine follow-up


*  high risk patients:  optional CT at 12 months





Multiple nodules size:  6-8 mm


*  low risk patients:  follow-up at 3-6 months, then consider further follow-up


at 18-24 months


*  high risk patients:  follow-up at 3-6 months, then at 18-24 months if no


change





Multiple nodules size:  >8 mm


*  low risk patients:  follow-up at 3-6 months, then consider further follow-up


at 18-24 months


*  high risk patients:  follow-up at 3-6 months, then at 18-24 months if no


change





Note:  newly detected indeterminate nodule in persons 35 years of age or older.


*  low risk patients:  minimal or absent history of smoking and/or other known


risk factors


*  high risk patients:  history of smoking or of other known risk factors (e.g.


first degree relative with lung cancer, or exposure to asbestos, radon, uranium)


*  if a nodule up to 8 mm is partly solid or is ground glass further follow-up


is required after 24 months to exclude possible slow growing adenocarcinoma


(KEVIN)





SUBSOLID NODULES





Solitary pure ground-glass nodule


*  nodule size <6 mm - no CT follow-up required


*  nodule size >=6 mm - follow-up CT at 6-12 months, then every 2 years until 5


years





Solitary part-solid nodule


*  nodule size <6 mm - no CT follow-up required


*  nodule size >=6 mm - follow-up CT at 3-6 months.  If unchanged, and solid


component remains <6 mm, then annual follow-up for 5 years





Multiple subsolid nodules


*  nodule size <6 mm - follow-up CT at 3-6 months, consider further follow-up at


2 and 4 years if stable


*  nodule size >=6 mm - follow-up CT at 3-6 months, subsequent management based


on the most suspicious nodule(s)











        














Electronically signed by:  Rehan Galeana M.D.


8/5/2017 9:41 AM





Dictated Date/Time:  8/5/2017 9:20 AM





 The status of this report is Signed.   


 Draft = Not yet reviewed or approved by Radiologist.  


 Signed = Reviewed and approved by Radiologist.


<AttendingPhy></AttendingPhy> <FamilyPhy>Chuck Guillory M.D.</FamilyPhy> <

PrimaryPhy>Chuck Guillory M.D.</PrimaryPhy> <UnitNumber>[id number]</UnitNumber> <

VisitNumber>[id number]</VisitNumber> <PatientName>JUDSON POWERS</PatientName

> <DateOfBirth>1945</DateOfBirth> <Location>[facility]</Location> <ServiceDate>

08/05/17</ServiceDate> <MNE>ESINDI</MNE> <OrderingPhy>Lucrecia Ma M.D.

</OrderingPhy> <OrderingPhyMNE>f rep ord dr lopes</OrderingPhyMNE> <

DictatingPhyMNE>f rep dict dr lopes</DictatingPhyMNE> <CCListMNE>f rep ct marianne</

CCListMNE> <AdmittingPhyMNE>f pt admit dr lopes</AdmittingPhyMNE> <AttendingPhyMNE

>f pt attend dr lopes</AttendingPhyMNE>


<ConsultingPhyMNE>f pt consult dr lopes</ConsultingPhyMNE> <FamilyPhyMNE>f pt fam 

dr lopes</FamilyPhyMNE> <OtherPhyMNE>f pt other dr lopes</OtherPhyMNE> <

PrimaryPhyMNE>f pt prim care dr lopes</PrimaryPhyMNE> <ReferringPhyMNE>f pt 

referring dr lopes</ReferringPhyMNE>





EKG


EKG shows normal sinus rhythm at 74 bpm, no acute ST-T changes, and no change 

compared to 08/30/2015





Impression


Assessment and Plan


Flank pain/urothelial thickening suggesting possible infection noted on CT of 

July 28/new finding of bilateral hydronephrosis on CT today/progression of pain 

to involving the interscapular area and now with the development of chills--


The patient does have several bilateral kidney stones, none of which would seem 

to cause any of her symptoms.  She does have progressive urologic signs as noted

, and will therefore be empirically placed on ceftriaxone 1 g IV daily, normal 

saline with KCl 20 mEq 100 ML's per hour, acetaminophen 650 mg IV every 6 hours 

for mild pain or temperature, morphine sulfate 2 mg IV every 2 hours when 

necessary moderate pain and 4 mg IV every 2 hours when necessary severe pain.


We'll consult urologist Dr. Leeroy Levy to see if any studies such as an IVP 

should be considered for possible bladder etiology.





CAD/hypertension/hyponatremia--


Hold amiloride/HCTZ 5/50, one half tablet daily at breakfast.


Hold aspirin 81 mg by mouth at bedtime


Hold Plavix 75 mg by mouth every morning


Change IMDUR extended release 30 mg by mouth every morning to Nitropaste 1 inch 

to anterior chest wall every 6 hours.


Hold metoprolol tartrate 25 mg by mouth every morning.


The patient did have a negative dobutamine stress echocardiogram while at the 

hospital when she was at the [facility].  She also had a negative CT scan of the 

chest for PE at that hospital as well.  CT dissection study was negative for 

dissecting aneurysm today.








GERD--change famotidine from 20 mg by mouth every morning to 20 mg IV twice a 

day.  If urologic workup and treatment does not adequately address patient's 

symptoms, consulting gastroenterology for possible EGD to assess for possible 

ulcer with her bedtime aspirin use may be necessary.





Hyperlipidemia--hold simvastatin 40 mg by mouth at bedtime and niacin extended 

release 500 mg by mouth at bedtime.





Anxiety--hold alprazolam 0.25 mg by mouth every 6 hours when necessary, and 

place on lorazepam 0.5 mg IV every 6 hours when necessary.





Level of Care


Telemetry





Advanced Directives


Existing Advance Directive:  No


Existing Living Will:  No


Existing Power of :  No





Resuscitation Status


FULL RESUSCITATION





VTE Prophylaxis


VTE Risk Assessment Done? Y/N:  Yes


Risk Level:  Moderate


Given or contraindicated:  SCD's





Social Service Consult


None Apply

## 2017-08-05 NOTE — DIAGNOSTIC IMAGING REPORT
CT ANGIOGRAPHY OF THE CHEST WITHOUT A WITH CONTRAST



CLINICAL HISTORY: Upper back pain. Possible aortic dissection.    



COMPARISON STUDY:  No previous studies for comparison.



FINDINGS: Unenhanced images were obtained through the upper thorax. A dose

lowering technique was used consistent with the principles of ALARA. CT

angiography was then performed in a dynamic helical fashion during intravenous

administration of 119 cc Optiray 320. MIP imaging was performed.



Unenhanced images reveal no evidence of acute aortic trauma.



Postcontrast images reveal no evidence of thoracic aortic aneurysm or

dissection. There are no pulmonary artery filling defects to indicate acute

pulmonary embolism.



There are no pathologically enlarged axillary, mediastinal, or hilar lymph

nodes.



No pleural effusions are visualized.



There is a 9 mm solid subpleural pulmonary nodule within the right upper lobe

anteriorly.



There is pulmonary emphysema.



There is a to small to characterize 8 mm hypodensity within the central liver.

There is a partially visualized 6 mm hypodensity within the left kidney likely

representing a cyst.



IMPRESSION:  

1. No evidence of thoracic aortic aneurysm or dissection

2. No evidence of acute pulmonary embolism

3. 9 mm solid right upper lobe pulmonary nodule.



Please refer to below summary of Fleischner criteria recommendations for

follow-up of incidental CT nodules (OMID Maldonado, Guidelines for management of

small pulmonary nodules detected on CT scans:  A statement from the Fleischner

Society, Radiology 237: [phone].)



SOLID NODULES



Solitary nodule size: <6 mm

*  low risk patients:  no follow-up needed

*  high risk patients:  optional CT at 12 months



Solitary nodule size:  6-8 mm

*  low risk patients:  follow-up at 6-12 months, then consider further follow-up

at 18-24 months

*  high risk patients:  initial follow-up CT at 6-12 months and then at 18-24

months if no change



Solitary nodule size: >8 mm

*  either low or high risk patients - consider follow-up CT at 3 months, and/or

CT-PET, and/or biopsy



Multiple nodules size:  <6 mm

*  low risk patients:  no routine follow-up

*  high risk patients:  optional CT at 12 months



Multiple nodules size:  6-8 mm

*  low risk patients:  follow-up at 3-6 months, then consider further follow-up

at 18-24 months

*  high risk patients:  follow-up at 3-6 months, then at 18-24 months if no

change



Multiple nodules size:  >8 mm

*  low risk patients:  follow-up at 3-6 months, then consider further follow-up

at 18-24 months

*  high risk patients:  follow-up at 3-6 months, then at 18-24 months if no

change



Note:  newly detected indeterminate nodule in persons 35 years of age or older.

*  low risk patients:  minimal or absent history of smoking and/or other known

risk factors

*  high risk patients:  history of smoking or of other known risk factors (e.g.

first degree relative with lung cancer, or exposure to asbestos, radon, uranium)

*  if a nodule up to 8 mm is partly solid or is ground glass further follow-up

is required after 24 months to exclude possible slow growing adenocarcinoma

(KEVIN)



SUBSOLID NODULES



Solitary pure ground-glass nodule

*  nodule size <6 mm - no CT follow-up required

*  nodule size >=6 mm - follow-up CT at 6-12 months, then every 2 years until 5

years



Solitary part-solid nodule

*  nodule size <6 mm - no CT follow-up required

*  nodule size >=6 mm - follow-up CT at 3-6 months.  If unchanged, and solid

component remains <6 mm, then annual follow-up for 5 years



Multiple subsolid nodules

*  nodule size <6 mm - follow-up CT at 3-6 months, consider further follow-up at

2 and 4 years if stable

*  nodule size >=6 mm - follow-up CT at 3-6 months, subsequent management based

on the most suspicious nodule(s)







        









Electronically signed by:  Rehan Galeana M.D.

8/5/2017 9:41 AM



Dictated Date/Time:  8/5/2017 9:20 AM

## 2017-08-05 NOTE — DIAGNOSTIC IMAGING REPORT
KUB



CLINICAL HISTORY: R renal colic    



COMPARISON STUDY:  7/10/2017 



FINDINGS: There is no pathologic bowel dilatation. There is a mild amount of

stool present within the right colon similar to the prior study. There are no

transition zones indicate bowel obstruction. Bilateral renal calculi are

visualized.



IMPRESSION:  

1. Bilateral nephrolithiasis, similar to the prior study

2. No evidence of pathologic bowel dilatation 







Electronically signed by:  Rehan Galeana M.D.

8/5/2017 7:51 AM



Dictated Date/Time:  8/5/2017 7:50 AM

## 2017-08-05 NOTE — DIAGNOSTIC IMAGING REPORT
EXAMINATION: RENAL ULTRASOUND



CLINICAL HISTORY: Right renal colic    



COMPARISON STUDY:  8/4/2014, CT scan dated 7/28/2017



FINDINGS: The right kidney measures 10 cm. The left kidney measures 10 cm. 

There is mild bilateral hydronephrosis.  There are multiple bilateral renal

calculi.



No bladder abnormalities are visualized.  Bilateral ureteral jets were

visualized.

                 

IMPRESSION : 

1. Multiple bilateral renal calculi

2. Mild bilateral hydronephrosis

3. Bilateral ureteral jets were visualized







Electronically signed by:  Rehan Galeana M.D.

8/5/2017 7:50 AM



Dictated Date/Time:  8/5/2017 7:47 AM

## 2017-08-06 VITALS
TEMPERATURE: 98.6 F | SYSTOLIC BLOOD PRESSURE: 142 MMHG | OXYGEN SATURATION: 92 % | DIASTOLIC BLOOD PRESSURE: 68 MMHG | HEART RATE: 92 BPM

## 2017-08-06 VITALS
SYSTOLIC BLOOD PRESSURE: 94 MMHG | DIASTOLIC BLOOD PRESSURE: 56 MMHG | OXYGEN SATURATION: 95 % | HEART RATE: 89 BPM | TEMPERATURE: 98.24 F

## 2017-08-06 VITALS
SYSTOLIC BLOOD PRESSURE: 126 MMHG | DIASTOLIC BLOOD PRESSURE: 69 MMHG | OXYGEN SATURATION: 91 % | HEART RATE: 80 BPM | TEMPERATURE: 98.42 F

## 2017-08-06 VITALS
HEART RATE: 86 BPM | OXYGEN SATURATION: 93 % | TEMPERATURE: 98.24 F | DIASTOLIC BLOOD PRESSURE: 61 MMHG | SYSTOLIC BLOOD PRESSURE: 117 MMHG

## 2017-08-06 VITALS — OXYGEN SATURATION: 92 % | HEART RATE: 80 BPM

## 2017-08-06 VITALS — HEART RATE: 96 BPM | OXYGEN SATURATION: 93 %

## 2017-08-06 VITALS — OXYGEN SATURATION: 96 % | HEART RATE: 85 BPM

## 2017-08-06 VITALS
TEMPERATURE: 98.24 F | SYSTOLIC BLOOD PRESSURE: 117 MMHG | HEART RATE: 86 BPM | OXYGEN SATURATION: 93 % | DIASTOLIC BLOOD PRESSURE: 61 MMHG

## 2017-08-06 LAB
ANION GAP SERPL CALC-SCNC: 8 MMOL/L (ref 3–11)
BASOPHILS # BLD: 0.02 K/UL (ref 0–0.2)
BASOPHILS NFR BLD: 0.2 %
BUN SERPL-MCNC: 5 MG/DL (ref 7–18)
BUN/CREAT SERPL: 10.4 (ref 10–20)
CALCIUM SERPL-MCNC: 7.1 MG/DL (ref 8.5–10.1)
CHLORIDE SERPL-SCNC: 107 MMOL/L (ref 98–107)
CO2 SERPL-SCNC: 23 MMOL/L (ref 21–32)
COMPLETE: YES
CREAT CL PREDICTED SERPL C-G-VRATE: 85.6 ML/MIN
CREAT SERPL-MCNC: 0.47 MG/DL (ref 0.6–1.2)
EOSINOPHIL NFR BLD AUTO: 196 K/UL (ref 130–400)
GLUCOSE SERPL-MCNC: 101 MG/DL (ref 70–99)
HCT VFR BLD CALC: 36.6 % (ref 37–47)
IG%: 0.2 %
IMM GRANULOCYTES NFR BLD AUTO: 12.4 %
LYMPHOCYTES # BLD: 1.04 K/UL (ref 1.2–3.4)
MAGNESIUM SERPL-MCNC: 1.9 MG/DL (ref 1.8–2.4)
MCH RBC QN AUTO: 32.1 PG (ref 25–34)
MCHC RBC AUTO-ENTMCNC: 34.2 G/DL (ref 32–36)
MCV RBC AUTO: 93.8 FL (ref 80–100)
MONOCYTES NFR BLD: 8.4 %
NEUTROPHILS # BLD AUTO: 0.4 %
NEUTROPHILS NFR BLD AUTO: 78.4 %
PMV BLD AUTO: 10 FL (ref 7.4–10.4)
POTASSIUM SERPL-SCNC: 4 MMOL/L (ref 3.5–5.1)
RBC # BLD AUTO: 3.9 M/UL (ref 4.2–5.4)
SODIUM SERPL-SCNC: 138 MMOL/L (ref 136–145)
WBC # BLD AUTO: 8.42 K/UL (ref 4.8–10.8)

## 2017-08-06 RX ADMIN — SODIUM CHLORIDE AND POTASSIUM CHLORIDE SCH MLS/HR: 9; 1.49 INJECTION, SOLUTION INTRAVENOUS at 08:42

## 2017-08-06 RX ADMIN — LEVALBUTEROL SCH MG: 1.25 SOLUTION, CONCENTRATE RESPIRATORY (INHALATION) at 01:48

## 2017-08-06 RX ADMIN — NITROGLYCERIN SCH INCH: 20 OINTMENT TOPICAL at 12:07

## 2017-08-06 RX ADMIN — IPRATROPIUM BROMIDE SCH MG: 0.5 SOLUTION RESPIRATORY (INHALATION) at 19:28

## 2017-08-06 RX ADMIN — IPRATROPIUM BROMIDE SCH MG: 0.5 SOLUTION RESPIRATORY (INHALATION) at 01:48

## 2017-08-06 RX ADMIN — IPRATROPIUM BROMIDE SCH MG: 0.5 SOLUTION RESPIRATORY (INHALATION) at 07:12

## 2017-08-06 RX ADMIN — NITROGLYCERIN SCH INCH: 20 OINTMENT TOPICAL at 01:00

## 2017-08-06 RX ADMIN — FAMOTIDINE SCH MLS/HR: 10 INJECTION INTRAVENOUS at 05:35

## 2017-08-06 RX ADMIN — NITROGLYCERIN SCH INCH: 20 OINTMENT TOPICAL at 07:00

## 2017-08-06 RX ADMIN — LEVALBUTEROL SCH MG: 1.25 SOLUTION, CONCENTRATE RESPIRATORY (INHALATION) at 14:20

## 2017-08-06 RX ADMIN — CEFTRIAXONE SODIUM SCH MLS/HR: 1 INJECTION, POWDER, FOR SOLUTION INTRAVENOUS at 12:08

## 2017-08-06 RX ADMIN — LEVALBUTEROL SCH MG: 1.25 SOLUTION, CONCENTRATE RESPIRATORY (INHALATION) at 19:28

## 2017-08-06 RX ADMIN — LEVALBUTEROL SCH MG: 1.25 SOLUTION, CONCENTRATE RESPIRATORY (INHALATION) at 07:12

## 2017-08-06 RX ADMIN — IPRATROPIUM BROMIDE SCH MG: 0.5 SOLUTION RESPIRATORY (INHALATION) at 14:20

## 2017-08-06 NOTE — DISCHARGE INSTRUCTIONS
Discharge Instructions


Date of Service


Aug 6, 2017.





Admission


Reason for Admission:  Bilateral Hydronephrosis,abdominal and back pain





Discharge


Discharge Diagnosis / Problem:  Suspected UTI,hydronephrosis





Discharge Goals


Goal(s):  Improve disease control, Diagnostic testing, Therapeutic intervention





Activity Recommendations


Activity Limitations:  resume your previous activity


Lifting Limitations:  none


Exercise/Sports Limitations:  none


Shower/Bathe:  no limitations





.





Instructions / Follow-Up


Instructions / Follow-Up


You were admitted with abdominal pain and lower back pain. You were found to 

have kidney stones in your kidneys, but none were stuck in your ureters. You 

did have hydronephrosis (back flow of urine into the kidneys) that was mild and 

is likely related to a bladder infection. You had complete resolution of your 

symptoms after receiving IV antibiotics to treat a UTI.


We did not have a urine culture to see which bacteria grew out, but you will be 

continued on a course of antibiotics for another 8 days. It is important that 

you finish out this course and follow up with your PCP within 1 week.





You may want to consider seeing a Gynecologist for your frequent UTIs.





If your lower abdominal pain returns, you may want to consider having your 

doctor order you a pelvic ultrasound to look at your uterus and ovaries.





Incidentally, you were found to have a 9 millimeter nodule in your right lung 

which will require a 3 month follow up CT scan of the chest. Your PCP can order 

this.





It is highly encouraged that you quit smoking immediately. Please talk to your 

doctor about ways to quit smoking.





Current Hospital Diet


Patient's current hospital diet: Regular Diet





Discharge Diet


Recommended Diet:  Regular Diet





Procedures


Procedures Performed:  


Renal Ultrasound


CT Thoracic Spine


CT chest


Abdominal xray





Pending Studies


Studies pending at discharge:  no





Laboratory Results





Last 24 Hours








Test


  8/6/17


06:01


 


White Blood Count 8.42 K/uL 


 


Red Blood Count 3.90 M/uL 


 


Hemoglobin 12.5 g/dL 


 


Hematocrit 36.6 % 


 


Mean Corpuscular Volume 93.8 fL 


 


Mean Corpuscular Hemoglobin 32.1 pg 


 


Mean Corpuscular Hemoglobin


Concent 34.2 g/dl 


 


 


Platelet Count 196 K/uL 


 


Mean Platelet Volume 10.0 fL 


 


Neutrophils (%) (Auto) 78.4 % 


 


Lymphocytes (%) (Auto) 12.4 % 


 


Monocytes (%) (Auto) 8.4 % 


 


Eosinophils (%) (Auto) 0.4 % 


 


Basophils (%) (Auto) 0.2 % 


 


Neutrophils # (Auto) 6.60 K/uL 


 


Lymphocytes # (Auto) 1.04 K/uL 


 


Monocytes # (Auto) 0.71 K/uL 


 


Eosinophils # (Auto) 0.03 K/uL 


 


Basophils # (Auto) 0.02 K/uL 


 


RDW Standard Deviation 46.3 fL 


 


RDW Coefficient of Variation 13.4 % 


 


Immature Granulocyte % (Auto) 0.2 % 


 


Immature Granulocyte # (Auto) 0.02 K/uL 


 


Sodium Level 138 mmol/L 


 


Potassium Level 4.0 mmol/L 


 


Chloride Level 107 mmol/L 


 


Carbon Dioxide Level 23 mmol/L 


 


Anion Gap 8.0 mmol/L 


 


Blood Urea Nitrogen 5 mg/dl 


 


Creatinine 0.47 mg/dl 


 


Est Creatinine Clear Calc


Drug Dose 85.6 ml/min 


 


 


Estimated GFR (


American) 114.4 


 


 


Estimated GFR (Non-


American 98.7 


 


 


BUN/Creatinine Ratio 10.4 


 


Random Glucose 101 mg/dl 


 


Calcium Level 7.1 mg/dl 


 


Magnesium Level 1.9 mg/dl 











Medical Emergencies








.


Who to Call and When:





Medical Emergencies:  If at any time you feel your situation is an emergency, 

please call 911 immediately.





.





Non-Emergent Contact


Non-Emergency issues call your:  Primary Care Provider


Call Non-Emergent contact if:  you have a fever, your pain is not controlled, 

your pain is worsening, your pain is unusual for you, your pain is concerning 

you, you have any medication questions





.


.








"Provider Documentation" section prepared by Ana Smallwood.








.





VTE Core Measure


Inpt VTE Proph given/why not?:  SCD's

## 2017-08-06 NOTE — UROLOGY CONSULTATION
History


General


Date of Service:


Aug 6, 2017.


Chief Complaint:  Back pain, history of stones, bilateral mild hydro on US


Primary Care Physician:


Chuck Guillory M.D.


Pt seen a urologist before?:  Yes


If yes, why?:  Myself for history of recurrent stones and sepsis





History of Present Illness


70 yo female, well known to myself, who has had onset of sharp upper midline 

back pain over a week ago. She was evaluated locally in the ER with imaging 

studies, reviewed by myself, then traveled out of the area for vacation which 

was characterized by worsening pain culminating in a hospitalization for 

further evaluation. She notes she has had numerous studies so far, none of 

which have clearly determined the etiology of her pain, including CT chest / abd

/ pelvis, vascular / dissection studies, renal US, KUB and a stress test at the 

outside facility. Patient distinguished her pain from her prior episodes of 

renal colic in the past and also denies fevers, LUTS or hematuria throughout 

her acute illness. No stone passage. She did have a positive UC&S in early Jul 2017 for Klebsiella and does have a history of recurrent UTIs. She denies 

stranguria or difficulties with her stream or emptying. Urology consultation is 

requested to assist with her inpatient care.





HPI - Stones


Location:  left, right, kidney


Patient has:  No gross hematuria, No hydronephrosis





Imaging


Imaging:  CT, KUB, Ultrasound


Images were done at:  [facility]





Laboratory


\


Last 24 Hours








Test


  8/5/17


12:17 8/6/17


06:01


 


Sodium Level 134 mmol/L  138 mmol/L 


 


Potassium Level 3.7 mmol/L  4.0 mmol/L 


 


Chloride Level 101 mmol/L  107 mmol/L 


 


Carbon Dioxide Level 27 mmol/L  23 mmol/L 


 


Anion Gap 6.0 mmol/L  8.0 mmol/L 


 


Blood Urea Nitrogen 6 mg/dl  5 mg/dl 


 


Creatinine 0.52 mg/dl  0.47 mg/dl 


 


Est Creatinine Clear Calc


Drug Dose 77.4 ml/min 


  85.6 ml/min 


 


 


Estimated GFR (


American) 110.6 


  114.4 


 


 


Estimated GFR (Non-


American 95.5 


  98.7 


 


 


BUN/Creatinine Ratio 11.8  10.4 


 


Random Glucose 88 mg/dl  101 mg/dl 


 


Calcium Level 7.7 mg/dl  7.1 mg/dl 


 


Magnesium Level 2.0 mg/dl  1.9 mg/dl 


 


White Blood Count  8.42 K/uL 


 


Red Blood Count  3.90 M/uL 


 


Hemoglobin  12.5 g/dL 


 


Hematocrit  36.6 % 


 


Mean Corpuscular Volume  93.8 fL 


 


Mean Corpuscular Hemoglobin  32.1 pg 


 


Mean Corpuscular Hemoglobin


Concent 


  34.2 g/dl 


 


 


Platelet Count  196 K/uL 


 


Mean Platelet Volume  10.0 fL 


 


Neutrophils (%) (Auto)  78.4 % 


 


Lymphocytes (%) (Auto)  12.4 % 


 


Monocytes (%) (Auto)  8.4 % 


 


Eosinophils (%) (Auto)  0.4 % 


 


Basophils (%) (Auto)  0.2 % 


 


Neutrophils # (Auto)  6.60 K/uL 


 


Lymphocytes # (Auto)  1.04 K/uL 


 


Monocytes # (Auto)  0.71 K/uL 


 


Eosinophils # (Auto)  0.03 K/uL 


 


Basophils # (Auto)  0.02 K/uL 


 


RDW Standard Deviation  46.3 fL 


 


RDW Coefficient of Variation  13.4 % 


 


Immature Granulocyte % (Auto)  0.2 % 


 


Immature Granulocyte # (Auto)  0.02 K/uL 











Problem List


Medical Problems:


(1) Nausea


Status: Acute  





(2) Renal colic


Status: Acute  





(3) Right flank pain


Status: Acute  





(4) Thoracic back pain


Status: Acute  











Past History


A Fib, anxiety, coronary artery disease, GERD, high cholesterol, hypertension, 

kidney stones, myocardial infarction, osteoporosis, urinary tract infection


Pt had a problem w anesthesia?:  No


Past Surgical History:  appendectomy, lithotripsy, ureteral stent, other (

hemorrhoidectomy)





Family History





Cancer


Heart disease


Kidney stones





Social History


Hx Tobacco Use In Past Year?:  Yes


Smoking:  less than 1 pack/day


Alcohol:  no current use


Drug use:  none


Marital status:  


Housing status:  lives with family


Occupation status:  retired





Immunizations


History of Influenza Vaccine:  N/A


History of Tetanus Vaccine?:  Yes


History of Pneumococcal:  Yes


History of Hepatitis B Vaccine:  No





History of MDRO


No





Allergies


Coded Allergies:  


     No Known Allergies (Unverified , 8/5/17)





Medications


Home Medications:





Home Meds and Scripts








 Medications  Dose


 Route/Sig


 Max Daily Dose Days Date Category


 


 Norco 5MG/325MG


  (Acetaminophen/Hydrocodone


 Bitart)  Tab  1 Tablet


 PO Q6 PRN


    7/28/17 Rx


 


 Urocit-K


  (Potassium


 Citrate) 10 Meq


 Tab  0.25 Mg


 PO QAM


    4/9/15 Reported


 


 Plavix


  (Clopidogrel


 Bisulfate) 75 Mg


 Tab  75 Mg


 PO QAM


    4/9/15 Reported


 


 Aspirin Ec


  (Aspirin) 81 Mg


 Tab  81 Mg


 PO HS


    4/9/15 Reported


 


 Vitamin D


  (Cholecalciferol)


 1,000 Inter.unit


 Tab  1,000 Inter.unit


 PO QAM


    8/18/14 Reported


 


 Lopressor


  (Metoprolol


 Tartrate) 25 Mg


 Tab  25 Mg


 PO QAM


    5/9/13 Reported


 


 Zocor


  (Simvastatin) 40


 Mg Tab  40 Mg


 PO HS


    4/4/13 Reported


 


 Imdur Ext Rel


  (Isosorbide


 Mononitrate) 30


 Mg Tabcr  30 Mg


 PO QAM


    4/4/13 Reported


 


 Niaspan Ext Rel


  (Niacin) 500 Mg


 Tabcr  500 Mg


 PO HS


    4/4/13 Reported


 


 Os-Ministerio 500 Plus D


  (Calcium/Vitamin


 D)  Tab  1 Tab


 PO QAM


    4/4/13 Reported


 


 Vitamin E


  (dl-Alpha-Tocopheryl


 Acetate) 400


 Inter.unit Cap  400 Inter.unit


 PO QAM


    4/4/13 Reported


 


 Nitrostat


  (Nitroglycerin)


 0.4 Mg Tab  0.4 Mg


 UT PRN


    4/4/13 Reported


 


 Pepcid


  (Famotidine) 20


 Mg Tab  20 Mg


 PO QAM


    4/4/13 Reported


 


 Moduretic


 5MG/50MG *


  (Amiloride/HCTZ)


 Tab  0.5 Tab


 PO @BREAKFAST


    10/25/06 Reported


 


 Xanax *


  (Alprazolam) 0.25


 Mg Tab  0.25 Mg


 PO Q6 PRN


    10/25/06 Reported








Inpatient Medications:





Current Inpatient Medications








 Medications


  (Trade)  Dose


 Ordered  Sig/Dominique


 Route  Start Time


 Stop Time Status Last Admin


Dose Admin


 


 Ioversol


  (Optiray 320)  125 ml  UD  PRN


 IV  8/5/17 09:00


 8/9/17 08:59   


 


 


 Potassium


 Chloride/Sodium


 Chloride  1,000 ml @ 


 100 mls/hr  Q10H


 IV  8/5/17 13:00


 9/4/17 11:12  8/6/17 08:42


100 MLS/HR


 


 Acetaminophen


  (Tylenol Tab)  650 mg  Q4H  PRN


 PO  8/5/17 11:15


 9/4/17 11:14   


 


 


 Zolpidem Tartrate


  (Ambien Tab)  5 mg  HSZ  PRN


 PO  8/5/17 11:15


 9/4/17 11:14   


 


 


 Nitroglycerin


  (Nitrostat Tab)  0.4 mg  UD  PRN


 SL  8/5/17 11:15


 9/4/17 11:14   


 


 


 Lorazepam


  (Ativan Inj)  0.5 mg  Q6H  PRN


 IV  8/5/17 11:15


 9/4/17 11:14   


 


 


 Famotidine 20 mg/


 Dextrose  102 ml @ 


 200 mls/hr  Q12H


 IV  8/5/17 18:00


 9/4/17 17:59  8/6/17 05:35


200 MLS/HR


 


 Nitroglycerin


  (Nitroglycerin


 2% Oint)  1 inch  Q6H


 EXT  8/5/17 13:00


 9/4/17 12:59  8/5/17 17:48


1 INCH


 


 Ondansetron HCl


  (Zofran Inj)  4 mg  Q6H  PRN


 IV  8/5/17 11:15


 9/4/17 11:14   


 


 


 Morphine Sulfate


  (MoRPHine


 SULFATE INJ)  2 mg  Q2H  PRN


 IV  8/5/17 11:15


 8/19/17 11:14  8/5/17 12:44


2 MG


 


 Morphine Sulfate


  (MoRPHine


 SULFATE INJ)  4 mg  Q2H  PRN


 IV  8/5/17 11:15


 8/19/17 11:14  8/5/17 17:46


4 MG


 


 Ceftriaxone


 Sodium 1 gm/


 Dextrose  50 ml @ 


 100 mls/hr  Q24H


 IV  8/5/17 14:00


 8/15/17 13:59  8/5/17 12:45


100 MLS/HR


 


 Ipratropium


 Bromide


  (Atrovent 0.02%


 0.5MG/2.5ML Neb)  0.5 mg  Q6R


 INH  8/5/17 15:00


 9/4/17 14:59  8/6/17 07:12


0.5 MG


 


 Levalbuterol


  (Xopenex 1.25MG/


 0.5ML Neb)  1.25 mg  Q6R


 INH  8/5/17 15:00


 9/4/17 14:59  8/6/17 07:12


1.25 MG


 


 Ipratropium


 Bromide


  (Atrovent 0.02%


 0.5MG/2.5ML Neb)  0.5 mg  Q2H  PRN


 INH  8/5/17 12:30


 9/4/17 12:29   


 


 


 Levalbuterol


  (Xopenex 1.25MG/


 0.5ML Neb)  1.25 mg  Q2H  PRN


 INH  8/5/17 12:30


 9/4/17 12:29   


 











Review of Systems


Review of Systems


Constitutional:  No fever, No chills


Eyes:  No double vision, No eye pain


Neurological:  No passing out, No seizures


Endocrine:  No tired/sluggish


Gastrointestinal:  No vomiting, No diarrhea


Cardiovascular:  + see HPI


Respiratory:  No coughing up blood


Skin:  No boils


Musculoskeletal:  + back pain


Blood / Lymphatic:  No bleed easily


Ears / Nose / Throat:  No sinus, No hoarse voice


Psychologic / Mental:  No trouble remembering


Female :  + see HPI, + kidney stones, No blood in urine





Physical Exam


Vital Signs:





Vital Signs Past 12 Hours








  Date Time  Temp Pulse Resp B/P (MAP) Pulse Ox O2 Delivery O2 Flow Rate FiO2


 


8/6/17 08:15      Room Air  


 


8/6/17 07:17 36.9 80 18 126/69 (88) 91 Room Air  


 


8/6/17 07:12  80 16  92 Room Air  


 


8/6/17 04:00      Room Air  


 


8/6/17 03:40 37.0 92 18 142/68 (92) 92 Room Air  


 


8/6/17 01:48  96 16  93 Room Air  


 


8/5/17 23:59      Room Air  


 


8/5/17 23:30 37.1 93 20 150/69 (96) 91 Room Air  








Physical Exam:


General Appearance:  no apparent distress, + thin


ENT:  normal ENT inspection, hearing grossly normal


Neck:  supple, no adenopathy


Respiratory/Chest:  no respiratory distress, no accessory muscle use


Cardiovascular:  no JVD


Gastrointestinal:  


   Abdomen:  normal abdomen


   Bladder:  normal bladder


   Renal:  normal renal


   Hernia:  absent hernia


   Liver:  normal liver


   Spleen:  normal spleen


Extremities:  non-tender


Neurologic/Psychiatric:  alert, oriented x 3


Skin:  normal color





Assessment & Plan


Assessment & Plan


A/P 70 yo female with stone history, unexplained back pain, mild hydro on US.





US, KUB, CT abd and CT chest images reviewed - patient has small bilateral 

renal stones, but neither on KUB nor, more accurately, on recent CT scan after 

onset of symptoms do her stones appear to be moving. The mild hydro described 

on US is unimpressive on personal reviewed and is also associated with 

bilateral ureteral jets in the bladder. This seems consistent with the 

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 

disease and multiple episodes of stone passage. Simultaneous KUB and her prior 

CT do not suggest obstructing ureteral stones as the etiology. In addition, her 

Cr is normal and she does not feel her pain matches her prior numerous episodes 

of stone passage. Also, her WBC is wnl and she remains afebrile, suggesting 

against an infectious  element as well. I do not think there is reason to 

proceed with stent placement from a  perspective and it seems relatively 

unlikely her stone disease is the reason for her current symptom cluster. Would 

continue medical evaluation and management. Patient already has an appointment 

to see myself next month for her stone disease - we might push this back seen 

her numerous recent imaging studies. Care is discussed with patient and family 

who vocalize good understanding of the treatment plan. Will continue to follow 

patient with you.

## 2017-08-09 NOTE — DISCHARGE SUMMARY
Discharge Summary


Date of Service


Aug 6, 2017.





Discharge Summary


Admission Date:


Aug 5, 2017 at 11:25


Discharge Date:  Aug 6, 2017


Discharge Disposition:  Home


Principal Diagnosis:  UTI, Bilateral hydronephrosis


Problems/Secondary Diagnoses:


Nephrolithiasis


Recurrent UTIs


Pulmonary emphysema


Current smoker


Pulmonary nodule- requires follow up CT in 3 months


CAD


Hypertension


Hyponatremia


GERD


Hyperlipidemia


Anxiety


Hypocalcemia


Hyperparathyroidism-suspect secondary to hypocalcemia


Immunizations:  


   Have You Had Influenza Vaccine:  N/A


   History of Tetanus Vaccine?:  Yes


   History of Pneumococcal:  Yes


   History of Hepatitis B Vaccine:  No


Procedures:


1. EXAMINATION: RENAL ULTRASOUND


CLINICAL HISTORY: Right renal colic    


COMPARISON STUDY:  8/4/2014, CT scan dated 7/28/2017


FINDINGS: The right kidney measures 10 cm. The left kidney measures 10 cm. 


There is mild bilateral hydronephrosis.  There are multiple bilateral renal


calculi.


No bladder abnormalities are visualized.  Bilateral ureteral jets were


visualized


IMPRESSION : 


1. Multiple bilateral renal calculi


2. Mild bilateral hydronephrosis


3. Bilateral ureteral jets were visualized





2. KUB


CLINICAL HISTORY: R renal colic    


COMPARISON STUDY:  7/10/2017 


FINDINGS: There is no pathologic bowel dilatation. There is a mild amount of


stool present within the right colon similar to the prior study. There are no


transition zones indicate bowel obstruction. Bilateral renal calculi are


visualized.


IMPRESSION:  


1. Bilateral nephrolithiasis, similar to the prior study


2. No evidence of pathologic bowel dilatation 





3. CT THORACIC SPINE WITHOUT


CT DOSE:    


CLINICAL HISTORY: Thoracic spine pain    


TECHNIQUE: Helical images were acquired in transverse plane. Sagittal and


coronal reformatted images were acquired  A dose lowering technique was utilized


adhering to the principles of ALARA.


COMPARISON STUDY:  None.


FINDINGS: No paraspinal masses are visualized.


There is pulmonary emphysema.


No fractures are visualized. No destructive lesions are evident.


IMPRESSION:  


1. No fractures or subluxations identified


2. No destructive lesions are visualized


3. No paraspinal masses are visualized.


4. Pulmonary emphysema 





4. CT ANGIOGRAPHY OF THE CHEST WITHOUT A WITH CONTRAST


CLINICAL HISTORY: Upper back pain. Possible aortic dissection.    


COMPARISON STUDY:  No previous studies for comparison.


FINDINGS: Unenhanced images were obtained through the upper thorax. A dose


lowering technique was used consistent with the principles of ALARA. CT


angiography was then performed in a dynamic helical fashion during intravenous


administration of 119 cc Optiray 320. MIP imaging was performed.


Unenhanced images reveal no evidence of acute aortic trauma.


Postcontrast images reveal no evidence of thoracic aortic aneurysm or


dissection. There are no pulmonary artery filling defects to indicate acute


pulmonary embolism.


There are no pathologically enlarged axillary, mediastinal, or hilar lymph


nodes.


No pleural effusions are visualized.


There is a 9 mm solid subpleural pulmonary nodule within the right upper lobe


anteriorly.


There is pulmonary emphysema.


There is a to small to characterize 8 mm hypodensity within the central liver.


There is a partially visualized 6 mm hypodensity within the left kidney likely


representing a cyst.


IMPRESSION:  


1. No evidence of thoracic aortic aneurysm or dissection


2. No evidence of acute pulmonary embolism


3. 9 mm solid right upper lobe pulmonary nodule.


Consultations:


Urology





Medication Reconciliation


New Medications:  


Cefdinir (Omnicef) 300 Mg Cap


300 MG PO Q12H for 8 Days, #16 CAP


First dose AM of 8/7/17


 


Continued Medications:  


Alprazolam (Xanax *) 0.25 Mg Tab


0.25 MG PO Q6 PRN for Anxiety





Amiloride/Hctz (Moduretic 5MG/50MG *)  Tab


0.5 TAB PO @BREAKFAST





Aspirin (Aspirin Ec) 81 Mg Tab


81 MG PO HS





Calcium/Vitamin D (Os-Ministerio 500 Plus D)  Tab


1 TAB PO QAM, TAB





Cholecalciferol (Vitamin D) 1,000 Inter.unit Tab


1000 INTER.UNIT PO QAM, TAB





Clopidogrel (Plavix) 75 Mg Tab


75 MG PO QAM, TAB





Famotidine (Pepcid) 20 Mg Tab


20 MG PO QAM, TAB





Hydrocodone/Acetaminophen 5MG/325MG (Norco 5MG/325MG)  Tab


1 TABLET PO Q6 PRN for Pain, #14 TAB





Isosorbide Mononitrate Ext Rel (Imdur Ext Rel) 30 Mg Tabcr


30 MG PO QAM, TAB





Metoprolol Tartrate (Lopressor) (Lopressor) 25 Mg Tab


25 MG PO QAM, TAB





Niacin Ext Rel (Niaspan Ext Rel) 500 Mg Tabcr


500 MG PO HS, TAB





Nitroglycerin (Nitrostat) 0.4 Mg Tab


0.4 MG UT PRN, BTL





Potassium Citrate (Urocit-K) 10 Meq Tab


0.25 MG PO QAM





Simvastatin (Zocor) 40 Mg Tab


40 MG PO HS, TAB





Tocopheryl Acet,Dl-Alpha (Vitamin E) 400 Inter.unit Cap


400 INTER.UNIT PO QAM, CAP











Discharge Exam


Feeling very well. No back pain at all. She reports that she never had upper 

back pain as documented on admission; it was always lower back pain with some 

bilateral flank pain. She also had lower abdominal pain. All symptoms 

completely resolved since receiving Rocephin and one dose of pain meds last 

night. No chest pain, no SOB. Afebrile. No events on telemetry


Review of Systems:  


   Constitutional:  No fever, No chills


   Eyes:  No problem reported


   ENT:  No problem reported


   Respiratory:  No shortness of breath


   Cardiovascular:  No chest pain


   Abdomen:  No pain, No nausea, No vomiting, No diarrhea, No constipation, No 

GI bleeding


   Musculoskeletal:  No problem reported (no more back pain)


   Genitourinary - Female:  No problem reported


   Neurologic:  No problem reported


   Psychiatric:  No problem reported


   Endocrine:  No problem reported


   Hematologic / Lymphatic:  No problem reported


   Integumentary:  No problem reported


Physical Exam:  


   General Appearance:  WD/WN, no apparent distress


   Eyes:  normal inspection, sclerae normal


   ENT:  hearing grossly normal


   Neck:  supple, trachea midline


   Respiratory/Chest:  lungs clear, normal breath sounds, no respiratory 

distress, no accessory muscle use


   Cardiovascular:  regular rate, rhythm, no edema, no gallop, no JVD, no murmur

, normal peripheral pulses


   Abdomen / GI:  normal bowel sounds, non tender, soft, no organomegaly, no 

pulsatile mass


   Extremities:  normal inspection, no calf tenderness, normal capillary refill

, no pedal edema, + pertinent finding (NO CVA tenderness, no TTP over entire 

back)


   Neurologic/Psychiatric:  alert, normal mood/affect, oriented x 3


   Skin:  normal color, warm/dry, no rash





Hospital Course


The patient is a 71-year-old female with a h/o CAD, nephrolithiasis, smoking, 

HTN, and recurrent UTIs, who presented to the emergency department initially on 

July 28 with symptoms of right flank pain, was assessed there and release of 

that time on oral pain medications.  CT abd/pel at that time did show small 

gallstones but LFTs normal, and no evidence of obstructing uropathy or other 

explanation of pain. She then went to the [facility] for a week planned vacation, 

but felt miserable the entire time there, and was seen by emergency care who 

then referred her to a local hospital where she was admitted overnight.  During 

that hospitalization she had a workup for ACS given her history of CAD and the 

back pain. She had a dobutamine stress echocardiogram which reportedly was 

normal and also had a CT of the chest for pulmonary embolus which is normal as 

well.  The patient's symptoms continued to worsen and expanded to cause 

bilateral lower back pain, and thus the patient's and her  returned back 

to [place] early from their vacation.  She presented to the emergency 

department here with right flank pain and bilateral but R>L lower back pain, 

malaise, chills without documented fever, and has had decreased oral intake 

over the past 48 - 72 hours. 


Renal US this admission showed mild bilateral hydronephrosis but had bilateral 

ureteral jets. No signs of obstructive uropathy. She was given IV Rocephin 

empirically even though her UA did not show signs of infection, but more due to 

description of chills at home. She has been on multiple courses of antibiotics 

inthe last 4-6 weeks as an outpatient for UTIs and so UA could be falsely 

negative.


She had no evidence of sepsis at all here.


She was given a minimal amount of pain medication, IVFs, and by the morning, 

she was completely symptoms free. Of note, her workup for dissection, PE, and 

ACS was all normal.


Her urologist, Dr. Leeroy Levy, saw her and did not htink this was from her 

kidney stones or a  cause. Recommended follow up in 1 month as planned. 


Because she had a great response to Rocephin, she will be discharged to home on 

a 10 day course of Omnicef empirically as there was no urine culture to follow 

for antibiotic therapy guidance.


I advised her to f/u with GYN and/or Urology for her recurrent UTIs.


-continue K-citrate for stone prevention





CAD/hypertension/hyponatremia--Na+ 130 on admission likely secondary to 

dehydration, poor po intake, and diuretic use. Improved the following day wiht 

IVFs and holding diuretics.


-continue amiloride/HCTZ 5/50, one half tablet daily at breakfast on discharge


-continue ASA, Plavix on discharge


-continue IMDUR, metoprolol tartrate


The patient did have a negative dobutamine stress echocardiogram while at the 

hospital when she was at the [facility].  She also had a negative CT scan of the 

chest for PE at that hospital as well.  CT dissection study was negative for 

dissecting aneurysm here this admission








GERD--stable


-continue famotidine





Cholelithiasis- incidentally noted on CT last admit, no elevation of LFTs at 

all. She did have more RUQ pain 1 week ago on presentation. It is possible this 

was from symptomatic cholelithiasis.


-advised Gen Surgery consultation as an outpatient if she desires to discuss 

elective cholecystectomy





Hyperlipidemia--stable


-continue simvastatin and niacin 





Anxiety--stable


-continue  alprazolam 0








Pulmonary emphysema/Current smoker/Pulmonary nodule- strongly encouraged 

smoking cessation which she plans on doing. Has laready cut back.


-RUL 9 mm nodule on CT Chest


- requires follow up CT in 3 months which can be ordered by PCP


-discussed findings with pt and her family present





Hypocalcemia/Hyperparathyroidism-suspect elevated PTH appropriately secondary 

to hypocalcemia. Ca++ 7.7 here. iPTH was 99 on 4/2017 and she is being followed 

by Nephrology.


Reports she has upcoming Nephrology appointment and is to perform 24 hr urine 

which I presume is at least in part for a 24 hr urine calcium. This is all in 

regards to her frequent kidney stones.


-continue f/u with Nephrology


-ok to continue Calcium + Vit D and Vit D alone





Dispo- to home


Total Time Spent:  Greater than 30 minutes


This includes examination of the patient, discharge planning, medication 

reconciliation, and communication with other providers.





Discharge Instructions


Please refer to the electronic Patient Visit Report (Discharge Instructions) 

for additional information.





Follow-Up


PCP within 1 week


Nephrology within 1 month


Urology wihtin 1 month





CT Chest in 3 months vs PET/CT now-to be ordered by PCP





Additional Copies To


Chuck Guillory M.D.

## 2017-08-16 ENCOUNTER — HOSPITAL ENCOUNTER (OUTPATIENT)
Dept: HOSPITAL 45 - C.RAD | Age: 72
Discharge: HOME | End: 2017-08-16
Attending: FAMILY MEDICINE
Payer: COMMERCIAL

## 2017-08-16 DIAGNOSIS — R10.11: Primary | ICD-10-CM

## 2017-08-16 DIAGNOSIS — K22.4: ICD-10-CM

## 2017-08-16 NOTE — DIAGNOSTIC IMAGING REPORT
DOUBLE CONTRAST UPPER GI SERIES AND SMALL BOWEL FOLLOW-THROUGH



CLINICAL HISTORY:  Right upper quadrant and epigastric abdominal pain. Nausea

and vomiting of 2 weeks' duration.



COMPARISON STUDY: Abdominal CT dated 7/28/2017.



TECHNIQUE: An abdominal  radiograph was performed. A standard air contrast

upper GI series was then performed. Spot images of the esophagus and stomach

were obtained in multiple obliquities with upright and prone. The patient then

consumed several of thin barium and a small follow-through was performed.

Overhead radiographs and spot compression images were obtained.



FINDINGS:



The patient swallowed barium without difficulty. The esophagus is structurally

normal without evidence of intrinsic or extrinsic mass. A small pulsion

diverticulum is incidentally noted in the distal esophagus just above the

gastroesophageal junction. The esophageal mucosal pattern is normal. Mild to

moderate esophageal dysmotility is observed. No gastroesophageal reflux was

elicited by having the patient performed a Valsalva maneuver. The

gastroesophageal junction distends normally.



The stomach is normal in configuration and demonstrates normal distensibility.

No mass or ulceration is identified. There was no evidence of gastritis. The

duodenal bulb and sweep are unremarkable.



The abdominal  radiograph shows a nonobstructed abdominal bowel gas

pattern. Nonobstructing left renal calculi are identified. Mass or

calcifications are seen in the pelvis. The skeletal structures are osteopenic.

The bony pelvis appears intact.



On the small bowel follow-through, there is normal transit time with contrast

identified in the colon at 50 minutes. The small bowel mucosal pattern is

normal. There is no evidence of stricture or mass. The distal/terminal ileum are

normal in appearance on the spot compression views. There is a hernia containing

small bowel loops identified in the right pelvis/groin.



Fluoroscopy time: 3.2 minutes.



Fluoroscopic images: 36





IMPRESSION: 



1. Esophageal dysmotility.



2. A small pulsion diverticulum is incidentally noted in the distal esophagus.



3. A small bowel containing hernia is identified in the right lower

pelvis/groin. This was also seen by CT on 7/28/2017.



4. Otherwise unremarkable small bowel follow-through.



5. Nonobstructing left calculi are identified on the  radiograph.







Electronically signed by:  Chuck Rosado M.D.

8/16/2017 10:36 AM



Dictated Date/Time:  8/16/2017 10:32 AM

## 2017-08-22 ENCOUNTER — HOSPITAL ENCOUNTER (OUTPATIENT)
Dept: HOSPITAL 45 - C.NUCL | Age: 72
Discharge: HOME | End: 2017-08-22
Attending: FAMILY MEDICINE
Payer: COMMERCIAL

## 2017-08-22 DIAGNOSIS — R10.11: Primary | ICD-10-CM

## 2017-08-22 NOTE — DIAGNOSTIC IMAGING REPORT
HEPATOBILIARY HIDA IMAGING



CLINICAL HISTORY: 71 years-old Female presenting with R10.11 Abdominal pain,

acute, right upper quadrant [place]. 



TECHNIQUE: Immediately following the intravenous administration of 5.5 mCi

Tc-99m Choletec, dynamic anterior abdominal imaging was performed.



COMPARISON: CT from 8/5/2017..



FINDINGS: 

Uniform hepatic tracer accumulation is shown. Prompt intrahepatic biliary

excretion is seen. The gallbladder, common bile duct, and small bowel are all

visualized by 10 minutes. This appearance represents the normal sequence of

biliary excretion.



IMPRESSION:  

1. No evidence for cystic duct obstruction.









Electronically signed by:  Stephen Victoria M.D.

8/22/2017 12:38 PM



Dictated Date/Time:  8/22/2017 12:37 PM

## 2017-09-06 ENCOUNTER — HOSPITAL ENCOUNTER (OUTPATIENT)
Dept: HOSPITAL 45 - C.RAD | Age: 72
Discharge: HOME | End: 2017-09-06
Attending: UROLOGY
Payer: COMMERCIAL

## 2017-09-06 DIAGNOSIS — R31.29: Primary | ICD-10-CM

## 2017-09-06 NOTE — DIAGNOSTIC IMAGING REPORT
KUB



CLINICAL HISTORY: Right renal colic.



FINDINGS: An AP supine abdominal radiograph is compared to study dated 8/5/2017

and correlated with abdominal CT dated 7/28/2017. There are numerous small

nonobstructing calculi projecting over the left kidney measuring up to 5 mm.

Punctate right renal calculi are identified. There is no radiographic evidence

of ureteral stone. Radiodensities in the right upper quadrant and in the left

lower quadrant are new from 8/5/2017 and are consistent with retained enteric

contrast in diverticula of the duodenum and colon. The skeletal structures are

osteopenic. The bony structures appear intact. Pelvic flow was are observed.



IMPRESSION:



1. Bilateral nonobstructing renal calculi are noted. There is no radiographic

evidence of ureteral stone.



2. Retained enteric contrast is now seen within diverticula of the duodenum and

colon.







Electronically signed by:  Chuck Rosado M.D.

9/6/2017 4:12 PM



Dictated Date/Time:  9/6/2017 4:08 PM

## 2017-11-09 ENCOUNTER — HOSPITAL ENCOUNTER (OUTPATIENT)
Dept: HOSPITAL 45 - C.LABMFLN | Age: 72
Discharge: HOME | End: 2017-11-09
Attending: FAMILY MEDICINE
Payer: COMMERCIAL

## 2017-11-09 DIAGNOSIS — I10: ICD-10-CM

## 2017-11-09 DIAGNOSIS — E78.5: Primary | ICD-10-CM

## 2017-11-09 DIAGNOSIS — D64.9: ICD-10-CM

## 2017-11-09 DIAGNOSIS — M81.0: ICD-10-CM

## 2017-11-09 DIAGNOSIS — I48.0: ICD-10-CM

## 2017-11-09 LAB
ALT SERPL-CCNC: 20 U/L (ref 12–78)
ANION GAP SERPL CALC-SCNC: 8 MMOL/L (ref 3–11)
AST SERPL-CCNC: 22 U/L (ref 15–37)
BASOPHILS # BLD: 0.03 K/UL (ref 0–0.2)
BASOPHILS NFR BLD: 0.5 %
BUN SERPL-MCNC: 24 MG/DL (ref 7–18)
BUN/CREAT SERPL: 35 (ref 10–20)
CALCIUM SERPL-MCNC: 8.8 MG/DL (ref 8.5–10.1)
CHLORIDE SERPL-SCNC: 107 MMOL/L (ref 98–107)
CHOLEST/HDLC SERPL: 3.6 {RATIO}
CO2 SERPL-SCNC: 25 MMOL/L (ref 21–32)
COMPLETE: YES
CREAT SERPL-MCNC: 0.68 MG/DL (ref 0.6–1.2)
EOSINOPHIL NFR BLD AUTO: 197 K/UL (ref 130–400)
GLUCOSE SERPL-MCNC: 86 MG/DL (ref 70–99)
GLUCOSE UR QL: 39 MG/DL
HCT VFR BLD CALC: 40 % (ref 37–47)
IG%: 0.2 %
IMM GRANULOCYTES NFR BLD AUTO: 27.1 %
KETONES UR QL STRIP: 86 MG/DL
LYMPHOCYTES # BLD: 1.65 K/UL (ref 1.2–3.4)
MCH RBC QN AUTO: 32 PG (ref 25–34)
MCHC RBC AUTO-ENTMCNC: 33.8 G/DL (ref 32–36)
MCV RBC AUTO: 94.8 FL (ref 80–100)
MONOCYTES NFR BLD: 8 %
NEUTROPHILS # BLD AUTO: 2 %
NEUTROPHILS NFR BLD AUTO: 62.2 %
NITRITE UR QL STRIP: 71 MG/DL (ref 0–150)
PH UR: 139 MG/DL (ref 0–200)
PMV BLD AUTO: 10.1 FL (ref 7.4–10.4)
POTASSIUM SERPL-SCNC: 3.7 MMOL/L (ref 3.5–5.1)
RBC # BLD AUTO: 4.22 M/UL (ref 4.2–5.4)
SODIUM SERPL-SCNC: 139 MMOL/L (ref 136–145)
VERY LOW DENSITY LIPOPROT CALC: 14 MG/DL
WBC # BLD AUTO: 6.09 K/UL (ref 4.8–10.8)

## 2018-02-12 ENCOUNTER — HOSPITAL ENCOUNTER (OUTPATIENT)
Dept: HOSPITAL 45 - C.MAMM | Age: 73
Discharge: HOME | End: 2018-02-12
Attending: FAMILY MEDICINE
Payer: COMMERCIAL

## 2018-02-12 DIAGNOSIS — M81.0: Primary | ICD-10-CM

## 2018-02-19 ENCOUNTER — HOSPITAL ENCOUNTER (OUTPATIENT)
Dept: HOSPITAL 45 - C.LABMFLN | Age: 73
Discharge: HOME | End: 2018-02-19
Attending: INTERNAL MEDICINE
Payer: COMMERCIAL

## 2018-02-19 DIAGNOSIS — I10: Primary | ICD-10-CM

## 2018-02-19 DIAGNOSIS — D64.9: ICD-10-CM

## 2018-02-19 DIAGNOSIS — M81.0: ICD-10-CM

## 2018-02-19 DIAGNOSIS — N20.0: ICD-10-CM

## 2018-02-19 LAB
ALBUMIN SERPL-MCNC: 3.6 GM/DL (ref 3.4–5)
ALP SERPL-CCNC: 54 U/L (ref 45–117)
ALT SERPL-CCNC: 17 U/L (ref 12–78)
AST SERPL-CCNC: 17 U/L (ref 15–37)
BUN SERPL-MCNC: 26 MG/DL (ref 7–18)
CALCIUM SERPL-MCNC: 8.4 MG/DL (ref 8.5–10.1)
CO2 SERPL-SCNC: 28 MMOL/L (ref 21–32)
CREAT SERPL-MCNC: 0.65 MG/DL (ref 0.6–1.2)
EOSINOPHIL NFR BLD AUTO: 238 K/UL (ref 130–400)
GLUCOSE SERPL-MCNC: 87 MG/DL (ref 70–99)
HCT VFR BLD CALC: 37.9 % (ref 37–47)
HGB BLD-MCNC: 12.9 G/DL (ref 12–16)
MCH RBC QN AUTO: 32.6 PG (ref 25–34)
MCHC RBC AUTO-ENTMCNC: 34 G/DL (ref 32–36)
MCV RBC AUTO: 95.7 FL (ref 80–100)
PHOSPHATE SERPL-MCNC: 2.9 MG/DL (ref 2.5–4.9)
PMV BLD AUTO: 10.5 FL (ref 7.4–10.4)
POTASSIUM SERPL-SCNC: 4 MMOL/L (ref 3.5–5.1)
PROT SERPL-MCNC: 7.2 GM/DL (ref 6.4–8.2)
RED CELL DISTRIBUTION WIDTH CV: 13.6 % (ref 11.5–14.5)
RED CELL DISTRIBUTION WIDTH SD: 47.4 FL (ref 36.4–46.3)
SODIUM SERPL-SCNC: 139 MMOL/L (ref 136–145)
URATE SERPL-MCNC: 4.5 MG/DL (ref 2.6–7.2)
WBC # BLD AUTO: 6.72 K/UL (ref 4.8–10.8)

## 2018-03-23 ENCOUNTER — HOSPITAL ENCOUNTER (OUTPATIENT)
Dept: HOSPITAL 45 - C.LABMFLN | Age: 73
Discharge: HOME | End: 2018-03-23
Attending: FAMILY MEDICINE
Payer: COMMERCIAL

## 2018-03-23 DIAGNOSIS — R30.0: Primary | ICD-10-CM

## 2018-05-04 ENCOUNTER — HOSPITAL ENCOUNTER (OUTPATIENT)
Dept: HOSPITAL 45 - C.CTS | Age: 73
Discharge: HOME | End: 2018-05-04
Attending: FAMILY MEDICINE
Payer: COMMERCIAL

## 2018-05-04 DIAGNOSIS — I51.7: ICD-10-CM

## 2018-05-04 DIAGNOSIS — N20.0: ICD-10-CM

## 2018-05-04 DIAGNOSIS — R91.8: Primary | ICD-10-CM

## 2018-05-04 DIAGNOSIS — J43.9: ICD-10-CM

## 2018-05-04 NOTE — DIAGNOSTIC IMAGING REPORT
CT SCAN OF THE CHEST WITHOUT IV CONTRAST



CLINICAL HISTORY: Pulmonary nodule.



COMPARISON STUDY:  Chest CT dated 11/8/2017 and 8/5/2017



TECHNIQUE:  CT scan of the thorax was performed from the thoracic inlet to the

upper abdomen. Images are reviewed in the axial, sagittal, and coronal planes.

IV contrast was not administered for this examination.  A dose lowering

technique was utilized adhering to the principles of ALARA.



CT DOSE: 177.85 mGy.cm



FINDINGS:



Thyroid: Imaged portions of the thyroid gland are normal in size and

attenuation.



Thoracic aorta: There is atherosclerotic calcification of the thoracic aorta,

which is normal in caliber and demonstrates standard 3-vessel arch anatomy.



Heart: The heart is enlarged and without pericardial effusion. The coronary

arteries are densely calcified. A metallic structure is again seen in the left

ventricle on image #205. The main pulmonary arteries appear dilated suggesting

pulmonary artery hypertension



Lungs and pleural spaces: Emphysematous change is again noted. There is no

airspace consolidation or pleural effusion. The trachea and central airways are

clear. There is a 10 mm irregular nodule along the anterior pleural surface in

the right upper lobe seen on image #137. An additional 3 mm right upper lobe

pulmonary nodule is seen on image #129.



Mediastinum: There is no mediastinal lymphadenopathy.



[place]: Not well assessed without IV contrast.



Axillae: There is no axillary lymphadenopathy.



Upper abdomen: Nonobstructing calculi are seen in the upper pole of the left

kidney. There is a small hiatal hernia.



Skeletal structures: The skeletal structures are osteopenic. No lytic or blastic

bony lesions are seen.





IMPRESSION:



1. Cardiomegaly and emphysema.



2. There is a 10 mm irregular right upper lobe pulmonary nodule. This appears

modestly increased in size as compared 8/5/2017. The appearance is highly

concerning for neoplasm and thoracic surgical consultation is recommended.



3. An additional 3 mm nodule in the right upper lobe is unchanged. No new

pulmonary lesions are identified.



4. No airspace consolidation or pleural effusion is seen.



5. Nonobstructing left renal calculi.







Electronically signed by:  Chuck Rosado M.D.

5/4/2018 11:30 AM



Dictated Date/Time:  5/4/2018 10:59 AM

## 2018-05-08 ENCOUNTER — HOSPITAL ENCOUNTER (OUTPATIENT)
Dept: HOSPITAL 45 - C.RAD | Age: 73
Discharge: HOME | End: 2018-05-08
Attending: UROLOGY
Payer: COMMERCIAL

## 2018-05-08 DIAGNOSIS — N20.0: Primary | ICD-10-CM

## 2018-05-08 NOTE — DIAGNOSTIC IMAGING REPORT
KUB



HISTORY: Follow-up study in a patient with nephrolithiasis  R35.0 Urine

[id number]



COMPARISON: KUB 9/6/2017



FINDINGS: The bowel gas pattern is non-obstructive. Moderate stool volume

throughout the colon. Vascular calcifications of the pelvis. There is no

organomegaly.  Renal shadows are partially obscured by bowel gas. Bilateral

nephrolithiasis redemonstrated, left greater than right with calculi of the

inferior pole left kidney measuring up to 4 mm. No ureteral calculi identified.

No pneumoperitoneum or pneumatosis. No fracture. Degenerative changes of the

hips, pelvis and spine.



 IMPRESSION:  



1. Bilateral nephrolithiasis, left greater than right appears unchanged. No

ureteral calculi identified.

2. Nonobstructive bowel gas pattern.







Electronically signed by:  Moreno Aldana M.D.

5/8/2018 12:08 PM



Dictated Date/Time:  5/8/2018 12:06 PM

## 2018-07-23 ENCOUNTER — HOSPITAL ENCOUNTER (OUTPATIENT)
Dept: HOSPITAL 45 - C.LABMFLN | Age: 73
Discharge: HOME | End: 2018-07-23
Attending: FAMILY MEDICINE
Payer: COMMERCIAL

## 2018-07-23 DIAGNOSIS — R10.811: ICD-10-CM

## 2018-07-23 DIAGNOSIS — R63.4: ICD-10-CM

## 2018-07-23 DIAGNOSIS — R11.0: Primary | ICD-10-CM

## 2018-07-23 LAB
ALBUMIN SERPL-MCNC: 3.9 GM/DL (ref 3.4–5)
ALP SERPL-CCNC: 69 U/L (ref 45–117)
ALT SERPL-CCNC: 19 U/L (ref 12–78)
AST SERPL-CCNC: 22 U/L (ref 15–37)
BASOPHILS # BLD: 0.02 K/UL (ref 0–0.2)
BASOPHILS NFR BLD: 0.3 %
BUN SERPL-MCNC: 15 MG/DL (ref 7–18)
CALCIUM SERPL-MCNC: 9.5 MG/DL (ref 8.5–10.1)
CO2 SERPL-SCNC: 27 MMOL/L (ref 21–32)
CREAT SERPL-MCNC: 0.8 MG/DL (ref 0.6–1.2)
EOS ABS #: 0.12 K/UL (ref 0–0.5)
EOSINOPHIL NFR BLD AUTO: 275 K/UL (ref 130–400)
GLUCOSE SERPL-MCNC: 83 MG/DL (ref 70–99)
HCT VFR BLD CALC: 38.4 % (ref 37–47)
HGB BLD-MCNC: 13.5 G/DL (ref 12–16)
IG#: 0.01 K/UL (ref 0–0.02)
IMM GRANULOCYTES NFR BLD AUTO: 21.9 %
LIPASE: 178 U/L (ref 73–393)
LYMPHOCYTES # BLD: 1.27 K/UL (ref 1.2–3.4)
MCH RBC QN AUTO: 33 PG (ref 25–34)
MCHC RBC AUTO-ENTMCNC: 35.2 G/DL (ref 32–36)
MCV RBC AUTO: 93.9 FL (ref 80–100)
MONO ABS #: 0.5 K/UL (ref 0.11–0.59)
MONOCYTES NFR BLD: 8.6 %
NEUT ABS #: 3.88 K/UL (ref 1.4–6.5)
NEUTROPHILS # BLD AUTO: 2.1 %
NEUTROPHILS NFR BLD AUTO: 66.9 %
PMV BLD AUTO: 10.2 FL (ref 7.4–10.4)
POTASSIUM SERPL-SCNC: 4 MMOL/L (ref 3.5–5.1)
PROT SERPL-MCNC: 7.8 GM/DL (ref 6.4–8.2)
RED CELL DISTRIBUTION WIDTH CV: 13.9 % (ref 11.5–14.5)
RED CELL DISTRIBUTION WIDTH SD: 47.7 FL (ref 36.4–46.3)
SODIUM SERPL-SCNC: 137 MMOL/L (ref 136–145)
WBC # BLD AUTO: 5.8 K/UL (ref 4.8–10.8)

## 2018-07-27 ENCOUNTER — HOSPITAL ENCOUNTER (OUTPATIENT)
Dept: HOSPITAL 45 - C.RAD | Age: 73
Discharge: HOME | End: 2018-07-27
Attending: FAMILY MEDICINE
Payer: COMMERCIAL

## 2018-07-27 DIAGNOSIS — R11.0: ICD-10-CM

## 2018-07-27 DIAGNOSIS — R63.4: Primary | ICD-10-CM

## 2018-07-27 DIAGNOSIS — R10.811: ICD-10-CM

## 2018-07-27 NOTE — DIAGNOSTIC IMAGING REPORT
GI SERIES W/AIR ROUTINE



CLINICAL HISTORY: R11.0 [id number] Weight lossR10.811 Abdominal tenderness,

rightpain. Nausea.



COMPARISON STUDY: None



FLUOROSCOPY TIME: 2.4 minutes.



FINDINGS: Trace silent aspiration. No significant cough reflex.



Several small diverticuli of the mid to distal thoracic esophagus.



Size configuration stomach appear normal. Mild gastroesophageal reflux.



Several prominent diverticuli in the region of the duodenal bulb and duodenal

sweep region.



Mucosal pattern and transit time throughout small bowel appear unremarkable.

Spot films of the terminal ileum are within normal limits.



IMPRESSION: 

1. Small amount of silent aspiration with no significant cough reflex.





2. Scattered diverticuli involving the mid to distal thoracic esophagus as well

as duodenal sweep

3. Normal small bowel.

















The above report was generated using voice recognition software.  It may contain

grammatical, syntax or spelling errors.







Electronically signed by:  Ramone Robins M.D.

7/27/2018 10:20 AM



Dictated Date/Time:  7/27/2018 10:17 AM

## 2018-07-27 NOTE — DIAGNOSTIC IMAGING REPORT
ABDOMINAL ULTRASOUND, RIGHT UPPER QUADRANT



HISTORY:      R11.0 [id number] Weight lossR10.811 Abdominal tenderness, right.



COMPARISON:  Abdomen and pelvis CT 7/28/2017.



FINDINGS:



Pancreas: The pancreatic tail is obscured by overlying bowel gas. The remaining

portions of the pancreas are within normal limits.



Liver: There is an 8 mm cyst within the right hepatic lobe. Hyperechoic area

adjacent to the falciform ligament suggestive of focal fatty change.



Gallbladder: No gallbladder wall thickening. Small echogenic mobile foci within

the gallbladder consistent with stones or sludge balls.



CBD: 4 mm.



Right kidney: No hydronephrosis. There is a small stone within the upper pole

the right kidney.



IMPRESSION: 



1. Small echogenic mobile foci within the gallbladder consistent with stones or

sludge balls. No gallbladder wall thickening.

2. Right-sided nephrolithiasis. No hydronephrosis.







Electronically signed by:  Lupillo Lawson M.D.

7/27/2018 10:15 AM



Dictated Date/Time:  7/27/2018 10:12 AM

## 2018-08-13 ENCOUNTER — HOSPITAL ENCOUNTER (OUTPATIENT)
Dept: HOSPITAL 45 - C.RAD | Age: 73
Discharge: HOME | End: 2018-08-13
Attending: THORACIC SURGERY (CARDIOTHORACIC VASCULAR SURGERY)
Payer: COMMERCIAL

## 2018-08-13 DIAGNOSIS — R91.1: Primary | ICD-10-CM

## 2018-08-13 NOTE — DIAGNOSTIC IMAGING REPORT
CHEST 2 VIEWS ROUTINE



CLINICAL HISTORY: R91.1 Lung nodule nodule. Dyspnea.



COMPARISON STUDY:  7/25/2018



FINDINGS: Mild emphysematous and interstitial change. No significant parenchymal

nodularity by routine film criteria. 



IMPRESSION:  Mild emphysematous change. Mild chronic interstitial change. No

acute process. 











The above report was generated using voice recognition software.  It may contain

grammatical, syntax or spelling errors.









Electronically signed by:  Ramone Robins M.D.

8/13/2018 9:52 AM



Dictated Date/Time:  8/13/2018 9:50 AM